# Patient Record
Sex: FEMALE | Employment: FULL TIME | URBAN - METROPOLITAN AREA
[De-identification: names, ages, dates, MRNs, and addresses within clinical notes are randomized per-mention and may not be internally consistent; named-entity substitution may affect disease eponyms.]

---

## 2017-03-02 DIAGNOSIS — H47.10 PAPILLEDEMA: ICD-10-CM

## 2017-03-02 DIAGNOSIS — G93.2 IDIOPATHIC INTRACRANIAL HYPERTENSION: ICD-10-CM

## 2017-03-03 ENCOUNTER — OFFICE VISIT (OUTPATIENT)
Dept: OPHTHALMOLOGY | Facility: CLINIC | Age: 22
End: 2017-03-03
Attending: OPHTHALMOLOGY
Payer: COMMERCIAL

## 2017-03-03 DIAGNOSIS — H47.10 PAPILLEDEMA: ICD-10-CM

## 2017-03-03 DIAGNOSIS — H50.34 INTERMITTENT EXOTROPIA, ALTERNATING: Primary | ICD-10-CM

## 2017-03-03 DIAGNOSIS — G93.2 IDIOPATHIC INTRACRANIAL HYPERTENSION: ICD-10-CM

## 2017-03-03 PROCEDURE — 92083 EXTENDED VISUAL FIELD XM: CPT | Mod: ZF | Performed by: OPHTHALMOLOGY

## 2017-03-03 PROCEDURE — 92060 SENSORIMOTOR EXAMINATION: CPT | Mod: ZF | Performed by: OPHTHALMOLOGY

## 2017-03-03 PROCEDURE — 99214 OFFICE O/P EST MOD 30 MIN: CPT | Mod: ZF

## 2017-03-03 ASSESSMENT — SLIT LAMP EXAM - LIDS
COMMENTS: NORMAL
COMMENTS: NORMAL

## 2017-03-03 ASSESSMENT — VISUAL ACUITY
METHOD: SNELLEN - LINEAR
CORRECTION_TYPE: CONTACTS
OS_CC+: -2
OS_CC: 20/20
OD_CC: 20/20

## 2017-03-03 ASSESSMENT — EXTERNAL EXAM - LEFT EYE: OS_EXAM: NORMAL

## 2017-03-03 ASSESSMENT — REFRACTION_WEARINGRX
OD_AXIS: 092
OS_AXIS: 091
OS_SPHERE: -5.00
OD_CYLINDER: +0.50
OS_CYLINDER: +1.00
OD_SPHERE: -5.00

## 2017-03-03 ASSESSMENT — TONOMETRY
OD_IOP_MMHG: 16
OS_IOP_MMHG: 16
IOP_METHOD: TONOPEN

## 2017-03-03 ASSESSMENT — CONF VISUAL FIELD
OD_NORMAL: 1
OS_NORMAL: 1

## 2017-03-03 ASSESSMENT — CUP TO DISC RATIO
OS_RATIO: 0.1
OD_RATIO: 0.1

## 2017-03-03 ASSESSMENT — EXTERNAL EXAM - RIGHT EYE: OD_EXAM: NORMAL

## 2017-03-03 NOTE — MR AVS SNAPSHOT
After Visit Summary   3/3/2017    Sudha Arevalo    MRN: [id number]           Patient Information     Date Of Birth          1995        Visit Information        Provider Department      3/3/2017 2:30 PM [facility] Jameson MD [facility]        Today's Diagnoses     Intermittent exotropia, alternating    -  1    Idiopathic intracranial hypertension        Papilledema           Follow-ups after your visit        Your next 10 appointments already scheduled     2017  3:15 PM CDT   RETURN NEURO with Jerald Jameson MD   Eye Clinic ([facility])    [address]  [address]  [facility] [id number] [phone]              Who to contact     Please call your clinic at [phone] to:    Ask questions about your health    Make or cancel appointments    Discuss your medicines    Learn about your test results    Speak to your doctor   If you have compliments or concerns about an experience at your clinic, or if you wish to file a complaint, please contact [facility] Patient Relations at [phone] or email us at [email or web address]         Additional Information About Your Visit        MyChart Information     Rivalryt is an electronic gateway that provides easy, online access to your medical records. With [employer], you can request a clinic appointment, read your test results, renew a prescription or communicate with your care team.     To sign up for Rivalryt visit the website at [email or web address]   You will be asked to enter the access code listed below, as well as some personal information. Please follow the directions to create your username and password.     Your access code is: [email or web address]  Expires: 2017  8:30 AM     Your access code will  in 90 days. If you need help or a new code, please contact your [facility] or call [phone]  for assistance.        Care EveryWhere ID     This is your Care EveryWhere ID. This could be used by other organizations to access your [place] medical records  [id number]         Blood Pressure from Last 3 Encounters:   No data found for BP    Weight from Last 3 Encounters:   No data found for Wt              We Performed the Following     DILATED FUNDUS EXAM     Glaucoma Top OU     IOP Measurement     Sensorimotor        Primary Care Provider Office Phone # Fax #    [facility] [phone]       [address], [address] [address]        Thank you!     Thank you for choosing EYE CLINIC  for your care. Our goal is always to provide you with excellent care. Hearing back from our patients is one way we can continue to improve our services. Please take a few minutes to complete the written survey that you may receive in the mail after your visit with us. Thank you!             Your Updated Medication List - Protect others around you: Learn how to safely use, store and throw away your medicines at www.disposemymeds.org.          This list is accurate as of: 3/3/17  3:32 PM.  Always use your most recent med list.                   Brand Name Dispense Instructions for use    acetaZOLAMIDE 500 MG 12 hr capsule    DIAMOX SEQUEL    60 capsule    Take 1 capsule (500 mg) by mouth 2 times daily

## 2017-03-03 NOTE — PROGRESS NOTES
1. Probable pseudotumor cerebri (patient has declined lumbar puncture but otherwise exam, MRI, and history consistent with diagnosis):      - For a more thorough description of the disease presentation, please see my letter from the patient's initial visit with me on 9/23/16  - - MRI / V negative for structural etiology for papilledema   - patient deferred lumbar puncture and accepts the very small chance of an alternative but potentially very serious etiology      - Exam today shows resolved papilledema in the right eye and possible trace residual papilledema in the left eye   - octopus automated 30 degree visual fields essentially full in both eyes at initial visit- still full in the right eye and in the left eye has mild biarcuate defects that I suspect are testing artifact from poor performance on test.  - headaches resolved.  Patient reports she has lost weight but not sure how much.    - Continue DIamox 500 mg twice a day   - Counseled patient on importance of weight loss. Continue to lose weight.  - return to clinic in 4 months     2. Intermittent exotropia   - Decent control. Patient wishes to observe without surgery.    4 month follow-up      Complete documentation of historical and exam elements from today's encounter can be found in the full encounter summary report (not reduplicated in this progress note).  I personally obtained the chief complaint(s) and history of present illness.  I confirmed and edited as necessary the review of systems, past medical/surgical history, family history, social history, and examination findings as documented by others; and I examined the patient myself.  I personally reviewed the relevant tests, images, and reports as documented above.  I formulated and edited as necessary the assessment and plan and discussed the findings and management plan with the patient and family   Jerald Jameson MD  10:10 PM  3/3/2017

## 2017-08-01 DIAGNOSIS — H47.10 PAPILLEDEMA: Primary | ICD-10-CM

## 2017-08-02 ENCOUNTER — OFFICE VISIT (OUTPATIENT)
Dept: OPHTHALMOLOGY | Facility: CLINIC | Age: 22
End: 2017-08-02
Attending: OPHTHALMOLOGY
Payer: COMMERCIAL

## 2017-08-02 DIAGNOSIS — H47.10 PAPILLEDEMA: ICD-10-CM

## 2017-08-02 PROCEDURE — 99214 OFFICE O/P EST MOD 30 MIN: CPT | Mod: ZF

## 2017-08-02 PROCEDURE — 92083 EXTENDED VISUAL FIELD XM: CPT | Mod: ZF | Performed by: OPHTHALMOLOGY

## 2017-08-02 ASSESSMENT — VISUAL ACUITY
OS_CC: 20/20
METHOD: SNELLEN - LINEAR
CORRECTION_TYPE: CONTACTS
OD_CC: 20/20

## 2017-08-02 ASSESSMENT — EXTERNAL EXAM - LEFT EYE: OS_EXAM: NORMAL

## 2017-08-02 ASSESSMENT — CUP TO DISC RATIO
OS_RATIO: 0.1
OD_RATIO: 0.1

## 2017-08-02 ASSESSMENT — CONF VISUAL FIELD
OS_NORMAL: 1
OD_NORMAL: 1

## 2017-08-02 ASSESSMENT — REFRACTION_WEARINGRX
OS_AXIS: 091
OD_CYLINDER: +0.50
OD_AXIS: 092
OD_SPHERE: -5.00
OS_SPHERE: -5.00
OS_CYLINDER: +1.00

## 2017-08-02 ASSESSMENT — TONOMETRY
IOP_METHOD: TONOPEN
OS_IOP_MMHG: 16
OD_IOP_MMHG: 15

## 2017-08-02 ASSESSMENT — SLIT LAMP EXAM - LIDS
COMMENTS: NORMAL
COMMENTS: NORMAL

## 2017-08-02 ASSESSMENT — EXTERNAL EXAM - RIGHT EYE: OD_EXAM: NORMAL

## 2017-08-02 NOTE — NURSING NOTE
Chief Complaints and History of Present Illnesses   Patient presents with     Neurologic Problem     IIH     HPI    Symptoms:              Comments:  Sudha is a 21 year old female presenting with a history of:    1. IIH   - still on 500 mg diamox bid. Not as consistent with medication. Sometimes misses days. Still no headaches when she misses doses.     2. X(T)  No changes.     Sanjay Palacios CO 3:20 PM August 2, 2017                Compliance is 65-70%.  No pulsatile tinnitus.  No headaches.

## 2017-08-02 NOTE — LETTER
August 3, 2017    RE: Sudha Arevalo  : 1995  MRN: [id number]    Dear Providers,    I saw our mutual patient, Sudha Arevalo, in follow-up in my clinic recently.  After a thorough neuro-ophthalmic history and examination, I came to the following conclusions:    1. Probable pseudotumor cerebri (patient has declined lumbar puncture but otherwise exam, MRI, and history consistent with diagnosis):       - For a more thorough description of the disease presentation, please see my letter from the patient's initial visit with me on 16  -  MRI / V negative for structural etiology for papilledema   - patient deferred lumbar puncture and accepts the very small chance of an alternative but potentially very serious etiology  - last seen March 3, 2017  - patient denies symptoms of increased intracranial pressure-no headaches.  - patient unsure of current status of her weight  - continues on Diamox 500 mg twice a day but admits she is only intermittently compliant (misses about 40% of the time)  - optic disc edema resolved today in both eyes.    - octopus automated 30 degree visual fields full in both eyes today  - Follow-up 6 months for recheck.  - Discussed the need to monitor weight and avoid weight gain (as the primary cause of pseudotumor cerebri recurrence)  - Stop DIamox    For further exam details, please feel free to contact our office for additional records.  If you wish to contact me regarding this patient please email me at [email or web address] or give my clinic a call to arrange a phone conversation.    Sincerely,    Jerald Jameson MD  , Neuro-Ophthalmology and Adult Strabismus  Department of Ophthalmology and Visual Neurosciences  [facility]

## 2017-08-02 NOTE — MR AVS SNAPSHOT
After Visit Summary   2017    Sudha Arevalo    MRN: [id number]           Patient Information     Date Of Birth          1995        Visit Information        Provider Department      2017 3:00 PM J[facility] Jameson MD [facility]        Today's Diagnoses     Papilledema           Follow-ups after your visit        Who to contact     Please call your clinic at [phone] to:    Ask questions about your health    Make or cancel appointments    Discuss your medicines    Learn about your test results    Speak to your doctor   If you have compliments or concerns about an experience at your clinic, or if you wish to file a complaint, please contact [facility] Patient Relations at [phone] or email us at [email or web address]         Additional Information About Your Visit        MyChart Information     B[employer] is an electronic gateway that provides easy, online access to your medical records. With [employer], you can request a clinic appointment, read your test results, renew a prescription or communicate with your care team.     To sign up for [email or web address].comt visit the website at [email or web address]   You will be asked to enter the access code listed below, as well as some personal information. Please follow the directions to create your username and password.     Your access code is: [email or web address]  Expires: 2017  6:30 AM     Your access code will  in 90 days. If you need help or a new code, please contact your [facility] or call [phone] for assistance.        Care EveryWhere ID     This is your Care EveryWhere ID. This could be used by other organizations to access your [place] medical records  [id number]         Blood Pressure from Last 3 Encounters:   No data found for BP    Weight from Last 3 Encounters:   No data found for Wt              We Performed the Following     DILATED FUNDUS  EXAM     Glaucoma Top OU     IOP Measurement        Primary Care Provider Office Phone # Fax #    [facility] [phone]       [facility] [address], [address]  [facility] [address]        Equal Access to Services     JORDAN ANDRADE : Hadii bakari monzon hadxaviero Soomaali, waaxda luqadaha, qaybta kaalmada adeegyada, waxsheeba idiin haynoemyn adesharonda oliver deandra solorzano. So [facility] [phone].    ATENCIÓN: Si habla español, tiene a gill disposición servicios gratuitos de asistencia lingüística. Llame al [phone].    We comply with applicable federal civil rights laws and [place] laws. We do not discriminate on the basis of race, color, national origin, age, disability sex, sexual orientation or gender identity.            Thank you!     Thank you for choosing EYE CLINIC  for your care. Our goal is always to provide you with excellent care. Hearing back from our patients is one way we can continue to improve our services. Please take a few minutes to complete the written survey that you may receive in the mail after your visit with us. Thank you!             Your Updated Medication List - Protect others around you: Learn how to safely use, store and throw away your medicines at www.disposemymeds.org.          This list is accurate as of: 8/2/17  4:50 PM.  Always use your most recent med list.                   Brand Name Dispense Instructions for use Diagnosis    acetaZOLAMIDE 500 MG 12 hr capsule    DIAMOX SEQUEL    60 capsule    Take 1 capsule (500 mg) by mouth 2 times daily    IIH (idiopathic intracranial hypertension)

## 2017-08-02 NOTE — PROGRESS NOTES
1. Probable pseudotumor cerebri (patient has declined lumbar puncture but otherwise exam, MRI, and history consistent with diagnosis):       - For a more thorough description of the disease presentation, please see my letter from the patient's initial visit with me on 9/23/16  -  MRI / V negative for structural etiology for papilledema   - patient deferred lumbar puncture and accepts the very small chance of an alternative but potentially very serious etiology    - last seen March 3, 2017  - patient denies symptoms of increased intracranial pressure-no headaches.  - patient unsure of current status of her weight  - continues on Diamox 500 mg twice a day but admits she is only intermittently compliant (misses about 40% of the time)    - optic disc edema resolved today in both eyes.    - octopus automated 30 degree visual fields full in both eyes today    - Follow-up 6 months for recheck.  - Discussed the need to monitor weight and avoid weight gain (as the primary cause of pseudotumor cerebri recurrence)  - Stop DIamox     Complete documentation of historical and exam elements from today's encounter can be found in the full encounter summary report (not reduplicated in this progress note).  I personally obtained the chief complaint(s) and history of present illness.  I confirmed and edited as necessary the review of systems, past medical/surgical history, family history, social history, and examination findings as documented by others; and I examined the patient myself.  I personally reviewed the relevant tests, images, and reports as documented above.  I formulated and edited as necessary the assessment and plan and discussed the findings and management plan with the patient and family     Jerald Jameson MD

## 2017-08-03 ENCOUNTER — RECORDS - HEALTHEAST (OUTPATIENT)
Dept: ADMINISTRATIVE | Facility: OTHER | Age: 22
End: 2017-08-03

## 2018-04-30 ENCOUNTER — COMMUNICATION - HEALTHEAST (OUTPATIENT)
Dept: SCHEDULING | Facility: CLINIC | Age: 23
End: 2018-04-30

## 2018-06-23 ENCOUNTER — COMMUNICATION - HEALTHEAST (OUTPATIENT)
Dept: SCHEDULING | Facility: CLINIC | Age: 23
End: 2018-06-23

## 2019-02-14 ENCOUNTER — OFFICE VISIT - HEALTHEAST (OUTPATIENT)
Dept: FAMILY MEDICINE | Facility: CLINIC | Age: 24
End: 2019-02-14

## 2019-02-14 DIAGNOSIS — M25.561 ACUTE PAIN OF RIGHT KNEE: ICD-10-CM

## 2019-04-17 ENCOUNTER — COMMUNICATION - HEALTHEAST (OUTPATIENT)
Dept: SCHEDULING | Facility: CLINIC | Age: 24
End: 2019-04-17

## 2019-04-18 ENCOUNTER — OFFICE VISIT - HEALTHEAST (OUTPATIENT)
Dept: FAMILY MEDICINE | Facility: CLINIC | Age: 24
End: 2019-04-18

## 2019-04-18 DIAGNOSIS — S89.91XD INJURY OF RIGHT KNEE, SUBSEQUENT ENCOUNTER: ICD-10-CM

## 2019-07-03 ENCOUNTER — COMMUNICATION - HEALTHEAST (OUTPATIENT)
Dept: INTERNAL MEDICINE | Facility: CLINIC | Age: 24
End: 2019-07-03

## 2019-07-03 DIAGNOSIS — Z23 NEED FOR HPV VACCINATION: ICD-10-CM

## 2019-07-08 ENCOUNTER — COMMUNICATION - HEALTHEAST (OUTPATIENT)
Dept: TELEHEALTH | Facility: CLINIC | Age: 24
End: 2019-07-08

## 2019-07-08 ENCOUNTER — AMBULATORY - HEALTHEAST (OUTPATIENT)
Dept: NURSING | Facility: CLINIC | Age: 24
End: 2019-07-08

## 2019-07-08 DIAGNOSIS — Z23 NEED FOR HPV VACCINATION: ICD-10-CM

## 2019-07-16 ENCOUNTER — OFFICE VISIT - HEALTHEAST (OUTPATIENT)
Dept: FAMILY MEDICINE | Facility: CLINIC | Age: 24
End: 2019-07-16

## 2019-07-16 DIAGNOSIS — L70.9 ACNE, UNSPECIFIED ACNE TYPE: ICD-10-CM

## 2019-08-14 ENCOUNTER — RECORDS - HEALTHEAST (OUTPATIENT)
Dept: ADMINISTRATIVE | Facility: OTHER | Age: 24
End: 2019-08-14

## 2019-09-19 ENCOUNTER — COMMUNICATION - HEALTHEAST (OUTPATIENT)
Dept: FAMILY MEDICINE | Facility: CLINIC | Age: 24
End: 2019-09-19

## 2020-02-24 ENCOUNTER — AMBULATORY - HEALTHEAST (OUTPATIENT)
Dept: NURSING | Facility: CLINIC | Age: 25
End: 2020-02-24

## 2020-02-24 DIAGNOSIS — Z23 NEED FOR INFLUENZA VACCINATION: ICD-10-CM

## 2020-10-01 ENCOUNTER — COMMUNICATION - HEALTHEAST (OUTPATIENT)
Dept: FAMILY MEDICINE | Facility: CLINIC | Age: 25
End: 2020-10-01

## 2020-10-01 ENCOUNTER — NURSE TRIAGE (OUTPATIENT)
Dept: NURSING | Facility: CLINIC | Age: 25
End: 2020-10-01

## 2020-10-01 NOTE — TELEPHONE ENCOUNTER
Patient reporting she was tested at 9/28/20 with symptoms of  sore throat and nasal discharge. Patient received positive COVID 19 lab results from [facility] today. Patient is requesting to know what she is to do with isolation guidelines? Stating symptoms have improved. Afebrile.       How can I protect others?    If you have symptoms: stay home and away from others (self-isolate) until:    You've had no fever--and no medicine that reduces fever--for 1 full day (24 hours). And       Your other symptoms have gotten better. For example, your cough or breathing has improved. And     At least 10 days have passed since your symptoms started. (If you've been told by a doctor that you have a weak immune system, wait 20 days.)       During this time:    Stay in your own room, including for meals. Use your own bathroom if you can.    Stay away from others in your home. No hugging, kissing or shaking hands. No visitors.     Don't go to work, school or anywhere else.     Clean  high touch  surfaces often (doorknobs, counters, handles, etc.). Use a household cleaning spray or wipes. You'll find a full list on the EPA website at www.epa.gov/pesticide-registration/list-n-disinfectants-use-against-sars-cov-2.     Cover your mouth and nose with a mask, tissue or other face covering to avoid spreading germs.    Wash your hands and face often with soap and water.    Caregivers in these groups are at risk for severe illness due to COVID-19:  o People 65 years and older  o People who live in a nursing home or long-term care facility  o People with chronic disease (lung, heart, cancer, diabetes, kidney, liver, immunologic)  o People who have a weakened immune system, including those who:  - Are in cancer treatment  - Take medicine that weakens the immune system, such as corticosteroids  - Had a bone marrow or organ transplant  - Have an immune deficiency  - Have poorly controlled HIV or AIDS  - Are obese (body mass index of 40 or  higher)  - Smoke regularly    Caregivers should wear gloves while washing dishes, handling laundry and cleaning bedrooms and bathrooms.    Wash and dry laundry with special caution. Don't shake dirty laundry, and use the warmest water setting you can.    If you have a weakened immune system, ask your doctor about other actions you should take.    For more tips, go to www.cdc.gov/coronavirus/2019-ncov/downloads/10Things.pdf.    You should not go back to work until you meet the guidelines above for ending your home isolation. You don't need to be retested for COVID-19 before going back to work--studies show that you won't spread the virus if it's been at least 10 days since your symptoms started (or 20 days, if you have a weak immune system).    Employers: This document serves as formal notice of your employee's medical guidelines for going back to work. They must meet the above guidelines before going back to work in person.    How can I take care of myself?    1. Get lots of rest. Drink extra fluids (unless a doctor has told you not to).    2. Take Tylenol (acetaminophen) for fever or pain. If you have liver or kidney problems, ask your family doctor if it's okay to take Tylenol.     Take either:     650 mg (two 325 mg pills) every 4 to 6 hours, or     1,000 mg (two 500 mg pills) every 8 hours as needed.     Note: Don't take more than 3,000 mg in one day. Acetaminophen is found in many medicines (both prescribed and over-the-counter medicines). Read all labels to be sure you don't take too much.    For children, check the Tylenol bottle for the right dose (based on their age or weight).    3. If you have other health problems (like cancer, heart failure, an organ transplant or severe kidney disease): Call your specialty clinic if you don't feel better in the next 2 days.    4. Know when to call 911: Emergency warning signs include:    Trouble breathing or shortness of breath    Pain or pressure in the chest that  doesn't go away    Feeling confused like you haven't felt before, or not being able to wake up    Bluish-colored lips or face          What to Do If You're Sick: www.cdc.gov/coronavirus/2019-ncov/about/steps-when-sick.html    Ending Home Isolation: www.cdc.gov/coronavirus/2019-ncov/hcp/disposition-in-home-patients.html     Caring for Someone with COVID-19: www.cdc.gov/coronavirus/2019-ncov/if-you-are-sick/care-for-someone.html     [facility] clinical trials (COVID-19 research studies): clinicalaffairs.[facility]-clinical-trials               Additional Information    Negative: [1] Caller is not with the adult (patient) AND [2] reporting urgent symptoms    Negative: Lab result questions    Negative: Medication questions    Negative: Caller can't be reached by phone    Negative: Caller has already spoken to PCP or another triager    Negative: RN needs further essential information from caller in order to complete triage    Negative: Requesting regular office appointment    Negative: [1] Caller requesting NON-URGENT health information AND [2] PCP's office is the best resource    Health Information question, no triage required and triager able to answer question    Protocols used: INFORMATION ONLY CALL-A-

## 2021-04-27 ENCOUNTER — COMMUNICATION - HEALTHEAST (OUTPATIENT)
Dept: SCHEDULING | Facility: CLINIC | Age: 26
End: 2021-04-27

## 2021-05-27 NOTE — PROGRESS NOTES
Office Visit - Follow Up   Sudha Arevalo   23 y.o. female    Date of Visit: 4/18/2019    Chief Complaint   Patient presents with     Follow-up     R Knee- Swelling Has Gone Down, Less Pain, Still Feels Firm To The Touch        Assessment and Plan   1. Injury of right knee, subsequent encounter  Symptoms have improved. Mild residual inflammation. Recommend using warm compresses and light weight knee exercise.   Follow up if symptom worsens.     The following high BMI interventions were performed this visit: encouragement to exercise and dietary management education, guidance, and counseling    No follow-ups on file.     History of Present Illness   Sudha Arevalo, 23 y.o. female presents for follow up of her right knee injury. She reports that fell two months ago while she was rollerskating. She was seen at the clinic and was evaluated for structural damage to her knee and ligaments. Evaluation was unremarkable but she was found to have hematoma. Since then hematoma has gotten better but not she can still feel very small swelling and tightness. She was wondering if this was normal. She has continued to remain active. She has no other concerns.     Review of Systems: A comprehensive review of systems was negative except as noted.     Medications, Allergies and Problem List   Reviewed and updated     Physical Exam   General Appearance: Well groomed    /81   Pulse 93   Wt (!) 279 lb 9.6 oz (126.8 kg)   BMI 40.12 kg/m    General appearance: alert, appears stated age and cooperative  Head: Normocephalic, without obvious abnormality, atraumatic  Extremities: Very small inflammation lateral to the patella of the right knee. No tenderness with palpation or movement.   Skin: Skin color, texture, turgor normal. No rashes or lesions  Neurologic: Grossly normal     Additional Information   No current outpatient medications on file.     No current facility-administered medications for this visit.      No  Known Allergies  Social History     Tobacco Use     Smoking status: Never Smoker     Smokeless tobacco: Never Used   Substance Use Topics     Alcohol use: Not on file     Drug use: Not on file        Gayathri Chun CNP

## 2021-05-27 NOTE — TELEPHONE ENCOUNTER
"Pt calling.  She had a right knee injury during a roller blade accident on 2/14/19.  Was seen at [facility] on 2/14/19  Patient presents with acute onset left knee pain.  Differentials include all of the following: knee ligament tear versus tendon tear, versus fracture.  Offered knee x-ray and patient declines at this time treatment plan included rest, ice, compression, elevation. Patient educated regarding when to follow-up if pain, edema, or ecchymosis increases. Patient is understanding of the plan.     Pt states her knee doesn't hurt anymore but has a \"hard lump on my knee that I would like to have assessed.    Will transfer Pt to scheduling to schedule an appt to be seen in clinic.  Natalie Dee, RN, [facility] RN Triage/Med Refills       .      "

## 2021-05-30 NOTE — TELEPHONE ENCOUNTER
Patient is on the CSS for 3rd HPV vaccine on 7/8/2019 last dose was,     HPV 9 Valent 4/18/2019, 1/12/2016           HPV Quadrivalent 8/12/2011        Will pend orders, please sign if appropriate.     Vanna Cardozo CMA  12:35 PM  7/3/2019

## 2021-05-30 NOTE — PROGRESS NOTES
Assessment/Plan:     1. Acne, unspecified acne type  Suspect hormonal acne.  Referral placed for dermatology to discuss treatments.  Encouraged patient to schedule a physical to complete her pap screening.   - Ambulatory referral to Dermatology        Subjective:     Sudha Arevalo is a 23 y.o. female who presents with a request for dermatology referral.  Patient has been dealing with acne for many years.  Mainly complains of pimples on her chin, neck, chest, and back.  The acne on her back has gotten better.  She has some scarring and discoloration of the skin from acne that she is unhappy with as well.  Patient has never seen a dermatologist.  She is currently washing her face twice daily with Neutrogena, using a toner, as well as a moisturizer.  She wears make-up on a daily basis.  She has noticed she tends to have more breakouts during her menstrual period.  She does have some unwanted hair to the chin and chest as well.  Her menstrual cycles are regular.  She has been working on diet and exercise, and has gradually lost about 40 pounds.      The following portions of the patient's history were reviewed and updated as appropriate: allergies, current medications.      Objective:     /60   Pulse 80   Wt (!) 256 lb 11.2 oz (116.4 kg)   LMP 06/28/2019   BMI 36.83 kg/m      General Appearance: Alert, cooperative, no distress, appears stated age  Skin: papular lesions to chin, chest, and neck. There is some coarse hair along the chin and chest.       Liset Montanez NP

## 2021-06-02 VITALS — WEIGHT: 293 LBS | BODY MASS INDEX: 42.47 KG/M2

## 2021-06-02 VITALS — WEIGHT: 279.6 LBS | BODY MASS INDEX: 40.12 KG/M2

## 2021-06-03 VITALS — BODY MASS INDEX: 36.83 KG/M2 | WEIGHT: 256.7 LBS

## 2021-06-11 NOTE — TELEPHONE ENCOUNTER
Who is calling:  Patient   Reason for Call:  Caller stated that she took the test for covid at [facility] and got the test back this morning and it came out positive. Caller just wanted to let Gayathri Chun FNP know.   Date of last appointment with primary care:   Okay to leave a detailed message: Yes

## 2021-06-17 NOTE — PATIENT INSTRUCTIONS - HE
Patient Instructions by Vero Garcia CNS at 2/14/2019  6:40 PM     Author: Vero Garcia CNS Service: -- Author Type: Clinical Nurse Specialist    Filed: 2/14/2019  7:15 PM Encounter Date: 2/14/2019 Status: Addendum    : Vero Garcia CNS (Clinical Nurse Specialist)    Related Notes: Original Note by Vero Garcia CNS (Clinical Nurse Specialist) filed at 2/14/2019  7:15 PM         Patient Education     Knee Sprain    A sprain is an injury to the ligaments or capsule that holds a joint together. There are no broken bones. Most sprains take 3 to 6 weeks to heal. If it a severe sprain where the ligament is completely torn, it can take months to recover.  Most knee sprains are treated with a splint, knee immobilizer brace, or elastic wrap for support. Severe sprains may require surgery.  Home care    Stay off the injured leg as much as possible until you can walk on it without pain. If you have a lot of pain with walking, crutches or a walker may be prescribed. (These can be rented or purchased at many pharmacies and surgical or orthopedic supply stores). Follow your healthcare provider's advice about when to begin putting weight on that leg.    Keep your leg elevated to reduce pain and swelling. When sleeping, place a pillow under the injured leg. When sitting, support the injured leg so it is level with your waist. This is very important during the first 48 hours.    Apply an ice pack over the injured area for 15 to 20 minutes every 3 to 6 hours. You should do this for the first 24 to 48 hours. You can make an ice pack by filling a plastic bag that seals at the top with ice cubes and then wrapping it with a thin towel. Continue to use ice packs for relief of pain and swelling as needed. As the ice melts, be careful to avoid getting your wrap, splint, or cast wet. After 48 hours, apply heat (warm shower or warm bath) for 15 to 20 minutes several times a day, or alternate ice and  heat. You can place the ice pack directly over the splint. If you have to wear a hook-and-loop knee brace, you can open it to apply the ice pack, or heat, directly to the knee. Never put ice directly on the skin. Always wrap the ice in a towel or other type of cloth.    You may use over-the-counter pain medicine to control pain, unless another pain medicine was prescribed.If you have chronic liver or kidney disease or ever had a stomach ulcer or GI bleeding, talk with your healthcare provider before using these medicines.    If you were given a splint, keep it completely dry at all times. Bathe with your splint out of the water, protected with 2 large plastic bags, rubber-banded at the top end. If a fiberglass splint gets wet, you can dry it with a hair dryer. If you have a hook-and-loop knee brace, you can remove this to bathe, unless told otherwise.  Follow-up care  Follow up with your doctor as advised. Any X-rays you had today dont show any broken bones, breaks, or fractures. Sometimes fractures dont show up on the first X-ray. Bruises and sprains can sometimes hurt as much as a fracture. These injuries can take time to heal completely. If your symptoms dont improve or they get worse, talk with your doctor. You may need a repeat X-ray. If X-rays were taken, you will be told of any new findings that may affect your care.  Call 911  Call 911 if you have:     Shortness of breath     Chest pain  When to seek medical advice  Call your healthcare provider right away if any of these occur:    The splint or knee immobilizer brace becomes wet or soft    The fiberglass cast or splint remains wet for more than 24 hours    Pain or swelling increases    The injured leg or toes become cold, blue, numb, or tingly  Date Last Reviewed: 11/20/2015 2000-2017 [employer]. [address] [place] [address], [place] [address]. All rights reserved. This information is not intended as a substitute for professional medical care.  Always follow your healthcare professional's instructions.           Patient Education     Knee Pain  Knee pain is very common. Its especially common in active people who put a lot of pressure on their knees, like runners. It affects women more often than men.  Your kneecap (patella) is a thick, round bone. It covers and protects the front portion of your knee joint. It moves along a groove in your thighbone (femur) as part of the patellofemoral joint. A layer of cartilage surrounds the underside of your kneecap. This layer protects it from grinding against your femur.  When this cartilage softens and breaks down, it can cause knee pain. This is partly because of repetitive stress. The stress irritates the lining of the joint. This causes pain in the underlying bone.  What causes knee pain?  Many things can cause knee pain. You may have more than one cause. Some of these include:    Overuse of the knee joint    The kneecap doesnt line up with the tissue around it    Damage to small nerves in the area    Damage to the ligament-like structure that holds the kneecap in place (retinaculum)    Breakdown of the bone under the cartilage    Swelling in the soft tissues around the kneecap    Injury  You might be more likely to have knee pain if you:    Exercise a lot    Recently increased the intensity of your workouts    Have a body mass index (BMI) greater than 25    Have poor alignment of your kneecap    Walk with your feet turned overly outward or inward    Have weakness in surrounding muscle groups (inner quad or hip adductor muscles)    Have too much tightness in surrounding muscle groups (hamstrings or iliotibial band)    Have a recent history of injury to the area    Are female  Symptoms of knee pain  This type of knee pain is a dull, aching pain in the front of the knee in the area under and around the kneecap. This pain may start quickly or slowly. Your pain might be worse when you squat, run, or sit for a long  time. You might also sometimes feel like your knee is giving out. You may have symptoms in one or both of your knees.  Diagnosing knee pain  Your healthcare provider will ask about your medical history and your symptoms. Be sure to describe any activities that make your knee pain worse. He or she will look at your knee. This will include tests of your range of motion, strength, and areas of pain of your knee. Your knee alignment will be checked.  Your healthcare provider will need to rule out other causes of your knee pain, such as arthritis. You may need an imaging test, such as an X-ray or MRI.  Treatment for knee pain  Treatments that can help ease your symptoms may include:    Avoiding activities for a while that make your pain worse, returning to activity over time    Icing the outside of your knee when it causes you pain    Taking over-the-counter pain medicine    Wearing a knee brace or taping your knee to support it    Wearing special shoe inserts to help keep your feet in the proper alignment    Doing special exercises to stretch and strengthen the muscles around your hip and your knee  These steps help most people manage knee pain. But some cases of knee pain need to be treated with surgery. You may need surgery right away. Or you may need it later if other treatments dont work. Your healthcare provider may refer you to an orthopedic surgeon. He or she will talk with you about your choices.  Preventing knee pain  Losing weight and correcting excess muscle tightness or muscle weakness may help lower your risk.  In some cases, you can prevent knee pain. To help prevent a flare-up of knee pain, you do these things:    Regularly do all the exercises your doctor or physical therapist advises    Support your knee as advised by your doctor or physical therapist    Increase training gradually, and ease up on training when needed    Have an expert check your gait for running or other sporting activities    Stretch  properly before and after exercise    Replace your running shoes regularly    Lose excess weight     When to call your healthcare provider  Call your healthcare provider right away if:    Your symptoms dont get better after a few weeks of treatment    You have any new symptoms   Date Last Reviewed: 4/1/2017 2000-2017 [employer]. [address] [place] [address]. All rights reserved. This information is not intended as a substitute for professional medical care. Always follow your healthcare professional's instructions.           Patient Education     Reducing Knee Pain and Swelling    Many treatments can help reduce pain and swelling in your knee. Your healthcare provider or physical therapist may suggest one or more of the following treatments:    Icing your knee helps reduce swelling. You may be asked to ice your knee once a day or more. Apply ice for about 15 to 20 minutes at a time, with at least 40 minutes between sessions. Always keep a towel between the ice and your skin.     Keeping your leg raised above your heart helps excess fluid flow out of your knee joint. This reduces swelling.    Compression means wrapping an elastic bandage or neoprene sleeve snugly around your knees. This keeps fluid from collecting in your knee joint.    Electrical stimulation, done by a physical therapist or , can help reduce excess fluid in your knee joint.    Anti-inflammatory medicines may be prescribed by your healthcare provider. You may take pills or receive injections in your knee.    Isometric (lashae) exercises strengthen the muscles that support your knee joint. They also help reduce excess fluid in your knee.    Massage helps fluid drain away from your knee.  Date Last Reviewed: 10/13/2015    3543-4867 [employer]. [address] [place] [address]. All rights reserved. This information is not intended as a substitute for professional medical care.  Always follow your healthcare professional's instructions.

## 2021-06-17 NOTE — TELEPHONE ENCOUNTER
Based review of the scheduling protocol, the patient would meet criteria for a virtual visit, however they are out of state or will be at the time of the visit.  Does this patient meet criteria, in your clinical judgement, to proceed with a virtual visit?   Please work directly with the patient to arrange the appropriate virtual vs in person visit.     Patient states she has been having headaches, started last Monday.    Patient has not been able to seek care in [place], asking if provider can do a virtual visit with her as soon as able.

## 2021-06-17 NOTE — TELEPHONE ENCOUNTER
I don't believe you are able to complete this visit since patient is not in [place] or [place] due to your credentialings. I'm not sure if patient is able to just submit an evisit on [employer]?

## 2021-06-19 NOTE — LETTER
Letter by Gayathri Chun FNP at      Author: Gayathri Chun FNP Service: -- Author Type: --    Filed:  Encounter Date: 9/19/2019 Status: (Other)         Sudha Arevalo  [address] [place] [address]             September 19, 2019         Dear Ms. Arevalo,    Our records indicate that you are due for a cervical cancer screen/pap screen.  Please call the clinic or use my chart and schedule an office visit to complete this.  If you have had this done somewhere other than [facility], please help us obtain a copy of your testing/results so we can update your records.  You can also just let us know when and where you had the testing completed through a phone call or through my chart.The records can be faxed to us at [phone].    Please call with questions or contact us using [employer].    Sincerely,        Electronically signed by SANDI Gao

## 2021-06-27 NOTE — PROGRESS NOTES
Progress Notes by Vero Garcia CNS at 2/14/2019  6:40 PM     Author: Vero Garcia CNS Service: -- Author Type: Clinical Nurse Specialist    Filed: 2/14/2019  7:36 PM Encounter Date: 2/14/2019 Status: Attested    : Vero Garcia CNS (Clinical Nurse Specialist) Cosigner: Virginia Oneill CNP at 2/14/2019  7:36 PM    Attestation signed by Virginia Oneill CNP at 2/14/2019  7:36 PM    I personally interviewed and examined the patient with a nurse practitioner student.  I reviewed the nurse practitioner student note, discussed the plan of care and I agree with the plan of care.    I discussed red flag symptoms, return precautions to clinic/ER and follow up care with patient/parent.  Expected clinical course, symptomatic cares advised. Questions answered. Patient/parent amenable with plan.                     Assessment:     1. Acute pain of right knee            Plan:     Patient presents with acute onset left knee pain.  Differentials include all of the following: knee ligament tear versus tendon tear, versus fracture.  Offered knee x-ray and patient declines at this time treatment plan included rest, ice, compression, elevation. Patient educated regarding when to follow-up if pain, edema, or ecchymosis increases. Patient is understanding of the plan.     Subjective:       23 y.o. female presents for evaluation of right knee pain.  Onset was on Monday while she was rollerskating.  She slid forward (went down hands first) and eventually sustained a slight trauma to the right knee.  Since that time she has had pain in the anterior knee.  Accompanied by edema and bruising.  She has been able to bear weight and in fact went skating again today, without pain or difficulty.  At this point due to cost she would prefer to avoid an x-ray.    The following portions of the patient's history were reviewed and updated as appropriate: allergies, current medications, past family history,  past medical history, past social history, past surgical history and problem list.    Review of Systems  Pertinent items are noted in HPI.  A 12 point comprehensive review of systems was negative except as noted.      Objective:      /81 (Patient Site: Right Arm, Patient Position: Sitting, Cuff Size: Adult Large)   Pulse (!) 101   Temp 98.2  F (36.8  C) (Oral)   Resp 16   Wt (!) 296 lb (134.3 kg)   SpO2 100%   BMI 42.47 kg/m    General appearance: alert, appears stated age and cooperative  Head: Normocephalic, without obvious abnormality, atraumatic  Extremities: Patient endorses right anterior knee pain, there is a small hematoma present, it is difficult to appreciate if there is a large amount of edema given morbid.  There are no open wounds.  Negative pivot shift test.  Denies pain with flexion or extension. Gait appears normal.   Skin: Skin color, texture, turgor normal. No rashes or lesions  Neurologic: Grossly normal     This note has been dictated using voice recognition software. Any grammatical or context distortions are unintentional and inherent to the software

## 2021-08-14 ENCOUNTER — HEALTH MAINTENANCE LETTER (OUTPATIENT)
Age: 26
End: 2021-08-14

## 2021-10-09 ENCOUNTER — HEALTH MAINTENANCE LETTER (OUTPATIENT)
Age: 26
End: 2021-10-09

## 2021-11-09 ENCOUNTER — OFFICE VISIT (OUTPATIENT)
Dept: FAMILY MEDICINE | Facility: CLINIC | Age: 26
End: 2021-11-09
Payer: COMMERCIAL

## 2021-11-09 VITALS
BODY MASS INDEX: 36.57 KG/M2 | SYSTOLIC BLOOD PRESSURE: 120 MMHG | WEIGHT: 255.44 LBS | OXYGEN SATURATION: 99 % | HEIGHT: 70 IN | DIASTOLIC BLOOD PRESSURE: 66 MMHG | HEART RATE: 80 BPM

## 2021-11-09 DIAGNOSIS — M25.569 ACUTE KNEE PAIN, UNSPECIFIED LATERALITY: Primary | ICD-10-CM

## 2021-11-09 PROCEDURE — 99213 OFFICE O/P EST LOW 20 MIN: CPT | Performed by: FAMILY MEDICINE

## 2021-11-09 RX ORDER — SPIRONOLACTONE 50 MG/1
TABLET, FILM COATED ORAL
COMMUNITY
Start: 2021-09-15 | End: 2024-07-29

## 2021-11-09 RX ORDER — ADAPALENE GEL USP, 0.3% 3 MG/G
GEL TOPICAL
COMMUNITY
Start: 2021-09-15 | End: 2024-04-29

## 2021-11-09 RX ORDER — CLINDAMYCIN PHOSPHATE 10 UG/ML
LOTION TOPICAL
COMMUNITY
Start: 2021-09-15 | End: 2024-04-29

## 2021-11-09 ASSESSMENT — MIFFLIN-ST. JEOR: SCORE: 1983.91

## 2021-11-09 NOTE — PROGRESS NOTES
"Assessment & Plan:       ICD-10-CM    1. Acute knee pain, unspecified laterality  M25.569           We reviewed the likely/potential etiology(ies) for her knee symptoms and we will have her continue with rest, ice, elevation and use of a brace or ace wrap for comfort. She should follow up if her symptoms do not improve over the next few weeks. We reviewed use of OTC analgesics, and she will call or return to clinic with any ongoing or worsening symptoms.        Subjective:          Sudha Arevalo is a 25 year old female who presents with pain involving the knee. Onset was 5 days after she stepped off a curb and hyperextended the leg. Current symptoms include: pain with flexion and extension, but there is no significant swelling or bruising. She denies feeling or hearing any popping. Patient has had no prior knee problems. Evaluation to date: none. Treatment to date: ice, rest, otc ibuprofen.      The following portions of the patient's history were reviewed and updated as appropriate: allergies, current medications, past family history, past medical history, past social history, past surgical history and problem list.      Review of Systems  12 point ROS negative except as noted above      Objective:     [unfilled]/66 (Patient Position: Sitting, Cuff Size: Adult Large)   Pulse 80   Ht 1.778 m (5' 10\")   Wt 115.9 kg (255 lb 7 oz)   LMP 10/18/2021 (Approximate)   SpO2 99%   BMI 36.65 kg/m    GEN: Alert and oriented, NAD, well nourished  SKIN:  Normal skin turgor, no lesions/rashes   HEENT: NC/AT, moist mucous membranes.    NECK: Normal.    CV: Regular rate and rhythm.   LUNGS: Clear. Normal respirations.   EXTREMITY: No edema, cyanosis. Both knees are normal in appearance. There is no joint line tenderness on either side.   NEURO: Grossly normal.        "

## 2021-12-19 ENCOUNTER — NURSE TRIAGE (OUTPATIENT)
Dept: NURSING | Facility: CLINIC | Age: 26
End: 2021-12-19
Payer: COMMERCIAL

## 2021-12-19 NOTE — TELEPHONE ENCOUNTER
Patient states she injured her right leg six weeks ago. States she has been able to do her normal activities however she is still having some discomfort on her. Per guideline, advised patient to be seen within 2-3 days. Caller verbalized understanding. Denies further questions.      Rajendra Simons RN  [facility]       Reason for Disposition    [1] After 3 days AND [2] pain not improved    Additional Information    Negative: Serious injury with multiple fractures (broken bones)    Negative: [1] Major bleeding (e.g., actively dripping or spurting) AND [2] can't be stopped    Negative: Bullet wound, stabbed by knife, or other serious penetrating wound    Negative: Looks like a dislocated joint (crooked or deformed)    Negative: Can't stand (bear weight) or walk    Negative: Sounds like a life-threatening emergency to the triager    Negative: Skin is split open or gaping (or length > 1/2 inch or 12 mm)    Negative: [1] Bleeding AND [2] won't stop after 10 minutes of direct pressure (using correct technique)    Negative: [1] Dirt in the wound AND [2] not removed with 15 minutes of scrubbing    Negative: Sounds like a serious injury to the triager    Negative: [1] SEVERE pain (e.g. excruciating)  AND [2] not improved 2 hours after pain medicine/ice packs    Negative: Suspicious history for the injury    Negative: Large swelling or bruise > 2 inches (5 cm)    Negative: [1] High-risk adult (e.g., age > 60, osteoporosis, chronic steroid use) AND [2] limping    Negative: [1] No prior tetanus shots (or is not fully vaccinated) AND [2] any wound (e.g., cut, scrape)    Negative: [1] HIV positive or severe immunodeficiency (severely weak immune system) AND [2] DIRTY cut or scrape    Negative: [1] Last tetanus shot > 5 years ago AND [2] DIRTY cut or scrape    Negative: [1] Last tetanus shot > 10 years ago AND [2] CLEAN cut or scrape (e.g., object AND skin were clean)    Negative: [1] After 3 days AND [2] still  limping    Protocols used: LEG INJURY-A-AH

## 2022-07-07 ENCOUNTER — NURSE TRIAGE (OUTPATIENT)
Dept: NURSING | Facility: CLINIC | Age: 27
End: 2022-07-07

## 2022-07-07 NOTE — TELEPHONE ENCOUNTER
"  TRIAGE CALL     Patient calling \"Bump on my arm\"     On right forearm  The size: dime size  Underneath th skin - it feel right on the bone  Bump has NOT got worse or better  Bump is a little firmer  Tender to the touch - but is less painful now  Started 3 weeks now  Now Pain 2  /10  Uncomfortable when touch by hard surfaces  Numbness or tingling None   Medications/measures taken none   Denies difficulty with work or sleep  Pt s PCP/Clinic: NO at this time  Per protocol, disposition to f/up appt  -   Care advise given and caller s questions were answered  Reminded we will be here 24/7 and can call back and ask to speak with a nurse. Lindsey Campbell RN [facility],  6:31 PM 7/7/2022    Reason for Disposition    Arm pain is a chronic symptom (recurrent or ongoing AND present > 4 weeks)    Protocols used: ARM PAIN-A-AH      "

## 2022-08-18 ENCOUNTER — OFFICE VISIT (OUTPATIENT)
Dept: INTERNAL MEDICINE | Facility: CLINIC | Age: 27
End: 2022-08-18
Payer: COMMERCIAL

## 2022-08-18 VITALS
BODY MASS INDEX: 36.65 KG/M2 | DIASTOLIC BLOOD PRESSURE: 68 MMHG | SYSTOLIC BLOOD PRESSURE: 120 MMHG | OXYGEN SATURATION: 98 % | HEIGHT: 70 IN | HEART RATE: 71 BPM

## 2022-08-18 DIAGNOSIS — Z12.4 ENCOUNTER FOR PAPANICOLAOU SMEAR FOR CERVICAL CANCER SCREENING: Primary | ICD-10-CM

## 2022-08-18 PROCEDURE — G0145 SCR C/V CYTO,THINLAYER,RESCR: HCPCS | Performed by: INTERNAL MEDICINE

## 2022-08-18 PROCEDURE — 87624 HPV HI-RISK TYP POOLED RSLT: CPT | Performed by: INTERNAL MEDICINE

## 2022-08-18 PROCEDURE — 99203 OFFICE O/P NEW LOW 30 MIN: CPT | Performed by: INTERNAL MEDICINE

## 2022-08-18 NOTE — PROGRESS NOTES
"  Assessment & Plan     Encounter for Papanicolaou smear for cervical cancer screening  25 yo female, is here today for the first GYN exam with PAP smear. She was sexually active in the past. LMP was 8/4/22. She denies increase discharge, bleeding in between the cycles, abdominal pain.  - Gynecologic Cytology (PAP Smear); Future    [id number]}     BMI:   Estimated body mass index is 36.65 kg/m  as calculated from the following:    Height as of this encounter: 1.778 m (5' 10\").    Weight as of 11/9/21: 115.9 kg (255 lb 7 oz).           Return in about 1 year (around 8/18/2023) for Routine preventive.    Leela Green MD  [facility]    Von Haley is a 26 year old, presenting for the following health issues:  Gyn Exam (Pap only. )      History of Present Illness       Reason for visit:  Pap smear              Review of Systems         Objective    /68   Pulse 71   Ht 1.778 m (5' 10\")   LMP 08/04/2022 (Approximate)   SpO2 98%   Breastfeeding No   BMI 36.65 kg/m    Body mass index is 36.65 kg/m .  Physical Exam   Abdomen: Soft, non-tender, bowel sounds active all four quadrants,  no masses, no organomegaly.  Pelvic:Normal external genitalia, speculum exam done, PAP done, cervix normal, bimanual exam showed no adnexal tenderness.        .  ..  "

## 2022-08-24 LAB
BKR LAB AP GYN ADEQUACY: NORMAL
BKR LAB AP GYN INTERPRETATION: NORMAL
BKR LAB AP HPV REFLEX: NORMAL
BKR LAB AP LMP: NORMAL
BKR LAB AP PREVIOUS ABNORMAL: NORMAL
PATH REPORT.COMMENTS IMP SPEC: NORMAL
PATH REPORT.COMMENTS IMP SPEC: NORMAL
PATH REPORT.RELEVANT HX SPEC: NORMAL

## 2022-08-26 LAB
HUMAN PAPILLOMA VIRUS 16 DNA: NEGATIVE
HUMAN PAPILLOMA VIRUS 18 DNA: NEGATIVE
HUMAN PAPILLOMA VIRUS FINAL DIAGNOSIS: NORMAL
HUMAN PAPILLOMA VIRUS OTHER HR: NEGATIVE

## 2022-09-11 ENCOUNTER — HEALTH MAINTENANCE LETTER (OUTPATIENT)
Age: 27
End: 2022-09-11

## 2022-11-15 ENCOUNTER — NURSE TRIAGE (OUTPATIENT)
Dept: NURSING | Facility: CLINIC | Age: 27
End: 2022-11-15

## 2022-11-16 NOTE — TELEPHONE ENCOUNTER
"Reports knee injury 1 year ago. Had virtual visit w/ family med doctor, P.T. a few weeks. Pain improved but never went away. Still has pain w/ deep knee bends and when putting all her weight on that leg.Little to no pain w/ regular walking or standing. No redness or swelling. No limping. Ibuprofen helpful. She works out 4-5x a week and says she never really slowed down during the injury. Advised see provider w/i 3 days. - maybe try one of the ortho urgent care/walk in. [facility] and other ortho groups have this service at select locations. Pt voiced understanding and agreement.       Reason for Disposition    [1] After 3 days AND [2] pain not improved    Additional Information    Negative: Serious injury with multiple fractures (broken bones)    Negative: [1] Major bleeding (e.g., actively dripping or spurting) AND [2] can't be stopped    Negative: Looks like a dislocated joint or knee cap (crooked or deformed)    Negative: Bullet wound, stabbed by knife, or other serious penetrating wound    Negative: Sounds like a life-threatening emergency to the triager    Negative: Wound looks infected    Negative: Can't stand (bear weight) or walk    Negative: Skin is split open or gaping (or length > 1/2 inch or 12 mm)    Negative: [1] Bleeding AND [2] won't stop after 10 minutes of direct pressure (using correct technique)    Negative: [1] Dirt in the wound AND [2] not removed with 15 minutes of scrubbing    Negative: Sounds like a serious injury to the triager    Negative: [1] SEVERE pain AND [2] not improved 2 hours after pain medicine/ice packs    Negative: Suspicious history for the injury    Negative: [1] High-risk adult (e.g., age > 60 years, osteoporosis, chronic steroid use) AND [2] limping    Negative: A \"snap\" or \"pop\" was heard at the time of injury    Negative: Large swelling or bruise (> 2 inches or 5 cm)    Negative: [1] No prior tetanus shots (or is not fully vaccinated) AND [2] any wound (e.g., cut, scrape)    " Negative: [1] HIV positive or severe immunodeficiency (severely weak immune system) AND [2] DIRTY cut or scrape    Negative: [1] Last tetanus shot > 5 years ago AND [2] DIRTY cut or scrape    Negative: [1] Last tetanus shot > 10 years ago AND [2] CLEAN cut or scrape (e.g., object AND skin were clean)    Negative: [1] Limp when walking AND [2] due to a twisted knee    Negative: [1] Limp when walking AND [2] due to a direct blow    Negative: Knee giving way (or buckling) when walking    Negative: Knee feels like it is locking (i.e., joint gets stuck, catching)    Protocols used: KNEE INJURY-A-AH

## 2023-10-07 ENCOUNTER — HEALTH MAINTENANCE LETTER (OUTPATIENT)
Age: 28
End: 2023-10-07

## 2024-04-28 ASSESSMENT — PATIENT HEALTH QUESTIONNAIRE - PHQ9
10. IF YOU CHECKED OFF ANY PROBLEMS, HOW DIFFICULT HAVE THESE PROBLEMS MADE IT FOR YOU TO DO YOUR WORK, TAKE CARE OF THINGS AT HOME, OR GET ALONG WITH OTHER PEOPLE: VERY DIFFICULT
SUM OF ALL RESPONSES TO PHQ QUESTIONS 1-9: 13
SUM OF ALL RESPONSES TO PHQ QUESTIONS 1-9: 13

## 2024-04-29 ENCOUNTER — OFFICE VISIT (OUTPATIENT)
Dept: FAMILY MEDICINE | Facility: CLINIC | Age: 29
End: 2024-04-29
Payer: COMMERCIAL

## 2024-04-29 VITALS
OXYGEN SATURATION: 99 % | TEMPERATURE: 97.4 F | HEIGHT: 70 IN | BODY MASS INDEX: 36.65 KG/M2 | DIASTOLIC BLOOD PRESSURE: 68 MMHG | RESPIRATION RATE: 18 BRPM | SYSTOLIC BLOOD PRESSURE: 120 MMHG | HEART RATE: 78 BPM

## 2024-04-29 DIAGNOSIS — F41.1 GAD (GENERALIZED ANXIETY DISORDER): Primary | ICD-10-CM

## 2024-04-29 DIAGNOSIS — F32.0 CURRENT MILD EPISODE OF MAJOR DEPRESSIVE DISORDER WITHOUT PRIOR EPISODE (H): ICD-10-CM

## 2024-04-29 PROCEDURE — 96127 BRIEF EMOTIONAL/BEHAV ASSMT: CPT

## 2024-04-29 PROCEDURE — 99213 OFFICE O/P EST LOW 20 MIN: CPT

## 2024-04-29 PROCEDURE — G2211 COMPLEX E/M VISIT ADD ON: HCPCS

## 2024-04-29 ASSESSMENT — ANXIETY QUESTIONNAIRES
1. FEELING NERVOUS, ANXIOUS, OR ON EDGE: SEVERAL DAYS
4. TROUBLE RELAXING: MORE THAN HALF THE DAYS
3. WORRYING TOO MUCH ABOUT DIFFERENT THINGS: MORE THAN HALF THE DAYS
8. IF YOU CHECKED OFF ANY PROBLEMS, HOW DIFFICULT HAVE THESE MADE IT FOR YOU TO DO YOUR WORK, TAKE CARE OF THINGS AT HOME, OR GET ALONG WITH OTHER PEOPLE?: VERY DIFFICULT
IF YOU CHECKED OFF ANY PROBLEMS ON THIS QUESTIONNAIRE, HOW DIFFICULT HAVE THESE PROBLEMS MADE IT FOR YOU TO DO YOUR WORK, TAKE CARE OF THINGS AT HOME, OR GET ALONG WITH OTHER PEOPLE: VERY DIFFICULT
5. BEING SO RESTLESS THAT IT IS HARD TO SIT STILL: NOT AT ALL
6. BECOMING EASILY ANNOYED OR IRRITABLE: SEVERAL DAYS
7. FEELING AFRAID AS IF SOMETHING AWFUL MIGHT HAPPEN: NOT AT ALL
GAD7 TOTAL SCORE: 8
GAD7 TOTAL SCORE: 8
7. FEELING AFRAID AS IF SOMETHING AWFUL MIGHT HAPPEN: NOT AT ALL
2. NOT BEING ABLE TO STOP OR CONTROL WORRYING: MORE THAN HALF THE DAYS

## 2024-04-29 ASSESSMENT — PAIN SCALES - GENERAL: PAINLEVEL: NO PAIN (0)

## 2024-04-29 NOTE — PROGRESS NOTES
Assessment & Plan     ALIA (generalized anxiety disorder) and Current mild episode of major depressive disorder without prior episode (H24)  Referral placed for mental health therapy and psychiatry as work only allows 8 sessions.  Will evaluate if therapy is sufficient or need to start medication management.  Leave of absence form completed for dates 5/13/2024 to 6/13/2024 and faxed.  - Adult Mental Health  Referral; Future  - Adult Mental Health  Referral; Future    Depression Screening Follow Up  Will follow up in 3 months after evaluation from psychiatry and therapy.    Follow Up Actions Taken  Crisis resource information provided in After Visit Summary  Mental Health Referral placed     Von Haley is a 28 year old, presenting for the following health issues:  Establish Care and Forms (Leave of absence form )        11/9/2021     2:55 PM   Additional Questions   Roomed by Jatin Mckinney     History of Present Illness       Reason for visit:  Physical, mental, emotional health    She eats 0-1 servings of fruits and vegetables daily.She consumes 0 sweetened beverage(s) daily.She exercises with enough effort to increase her heart rate 30 to 60 minutes per day.  She exercises with enough effort to increase her heart rate 5 days per week.      Patient is a 28-year-old female presenting for leave of absence from work for mental health.  Medical history includes general anxiety disorder, depression, and acne.  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  Currently in hybrid position.    Started seeing mental health specialist in 2020 for ALIA and depression through work: [facility]- [employer] [facility].  Work allows 8 sessions per mental health diagnosis.  Reports poor work culture and demanding work load, has been working long hours.  Has to support a lot of colleagues but does not have support herself.  Has spoken to manager related to  "concerns of no support but has not received additional support.  Has been experiencing insomnia related to work burn out and anxiety related to work.  Reports it hard to get out bed and become motivated.  Does reports feeling hopeless.  Currently not on any ALIA or depression medications.  At this time she is unsure if she would like to start medication management.  Denies self-harm or suicidal ideation.  Has been trying to establish night time routines and work boundaries.  Has good family and social support.  Patient is looking to take off 5/13/2024 to 6/13/2024 and has the PTO to cover the time off.  Goal with time off is rest and rejuvenation.          4/29/2024     8:54 AM   ALIA-7 SCORE   Total Score 8 (mild anxiety)   Total Score 8         4/28/2024    11:19 AM   PHQ   PHQ-9 Total Score 13   Q9: Thoughts of better off dead/self-harm past 2 weeks Not at all      Review of Systems  Review of systems negative otherwise known HPI.    Past Medical History:   Diagnosis Date    Amblyopia     Wore eye patch as a child    Strabismus      No past surgical history on file.    Family History   Problem Relation Age of Onset    Amblyopia No family hx of     Glaucoma No family hx of     Macular Degeneration No family hx of      Social History     Tobacco Use    Smoking status: Never    Smokeless tobacco: Never   Substance Use Topics    Alcohol use: Not on file     Current Outpatient Medications   Medication Sig Dispense Refill    metroNIDAZOLE (METROCREAM) 0.75 % external cream       spironolactone (ALDACTONE) 50 MG tablet        No current facility-administered medications for this visit.       Objective    /68 (BP Location: Right arm, Patient Position: Sitting, Cuff Size: Adult Large)   Pulse 78   Temp 97.4  F (36.3  C) (Temporal)   Resp 18   Ht 1.778 m (5' 10\")   LMP 04/08/2024 (Approximate)   SpO2 99%   BMI 36.65 kg/m    Body mass index is 36.65 kg/m .  Physical Exam   General: Alert, oriented, no acute " distress.    Respiratory: Easy work of breathing.   Cardiovascular: Appears warm and well-perfused.    Skin: No abnormalities noted on visualized skin.   Neurologic: Mentation intact and speech normal.     Psychiatric: Appropriate affect.     Signed Electronically by: DUANE Guerrier CNP

## 2024-05-07 ENCOUNTER — VIRTUAL VISIT (OUTPATIENT)
Dept: PSYCHOLOGY | Facility: CLINIC | Age: 29
End: 2024-05-07
Payer: COMMERCIAL

## 2024-05-07 DIAGNOSIS — F43.23 ADJUSTMENT DISORDER WITH MIXED ANXIETY AND DEPRESSED MOOD: Primary | ICD-10-CM

## 2024-05-07 PROCEDURE — 90834 PSYTX W PT 45 MINUTES: CPT | Mod: 95

## 2024-05-07 ASSESSMENT — COLUMBIA-SUICIDE SEVERITY RATING SCALE - C-SSRS
2. HAVE YOU ACTUALLY HAD ANY THOUGHTS OF KILLING YOURSELF?: NO
1. HAVE YOU WISHED YOU WERE DEAD OR WISHED YOU COULD GO TO SLEEP AND NOT WAKE UP?: NO
ATTEMPT LIFETIME: NO
TOTAL  NUMBER OF INTERRUPTED ATTEMPTS LIFETIME: NO
TOTAL  NUMBER OF ABORTED OR SELF INTERRUPTED ATTEMPTS LIFETIME: NO
6. HAVE YOU EVER DONE ANYTHING, STARTED TO DO ANYTHING, OR PREPARED TO DO ANYTHING TO END YOUR LIFE?: NO

## 2024-05-07 ASSESSMENT — PATIENT HEALTH QUESTIONNAIRE - PHQ9
SUM OF ALL RESPONSES TO PHQ QUESTIONS 1-9: 11
10. IF YOU CHECKED OFF ANY PROBLEMS, HOW DIFFICULT HAVE THESE PROBLEMS MADE IT FOR YOU TO DO YOUR WORK, TAKE CARE OF THINGS AT HOME, OR GET ALONG WITH OTHER PEOPLE: VERY DIFFICULT
SUM OF ALL RESPONSES TO PHQ QUESTIONS 1-9: 11

## 2024-05-07 NOTE — PROGRESS NOTES
[facility]   Mental Health & Addiction Services     Progress Note - Initial Visit    Patient  Name:  Sudha Arevalo Date: 5/7/2024         Service Type: Individual     Visit Start Time: 1:26pm  Visit End Time: 2:18pm    Visit #:  1    Attendees: Client    Service Modality:  Video Visit:      Provider verified identity through the following two step process.  Patient provided:  Patient was verified at admission/transfer    Telemedicine Visit: The patient's condition can be safely assessed and treated via synchronous audio and visual telemedicine encounter.      Reason for Telemedicine Visit: Services only offered telehealth    Originating Site (Patient Location): Patient's home    Distant Site (Provider Location): Provider Remote Setting- Home Office    Consent:  The patient/guardian has verbally consented to: the potential risks and benefits of telemedicine (video visit) versus in person care; bill my insurance or make self-payment for services provided; and responsibility for payment of non-covered services.     Patient would like the video invitation sent by:  My Chart    Mode of Communication:  Video Conference via Amwell    Distant Location (Provider):  Off-site    As the provider I attest to compliance with applicable laws and regulations related to telemedicine.       DATA:   Interactive Complexity: No   Crisis: No     Presenting Concerns/  Current Stressors:   Patient presents with stressors of anxiety and depression with functional impairments of physical symptoms such as nausea and muscle tension, feelings of low self-worth and self-esteem and rumination. Patient has struggled with symptoms off and on since her childhood. Patient also identified workplace stressors impacting symptoms.       ASSESSMENT:  Mental Status Assessment:  Appearance:   Appropriate   Eye Contact:   Good   Psychomotor Behavior: Normal   Attitude:   Pleasant  Orientation:   All  Speech   Rate /  Production: Normal/ Responsive   Volume:  Normal   Mood:    Anxious  Depressed   Affect:    Appropriate   Thought Content:  Clear  Rumination   Thought Form:  Coherent  Logical  [place]  Insight:    Good     Assessments completed prior to this visit:  The following assessments were completed by patient for this visit:  PHQ9:       4/28/2024    11:19 AM 5/7/2024     2:02 AM   PHQ-9 SCORE   PHQ-9 Total Score MyChart 13 (Moderate depression) 11 (Moderate depression)   PHQ-9 Total Score 13 11     GAD7:       4/29/2024     8:54 AM   ALIA-7 SCORE   Total Score 8 (mild anxiety)   Total Score 8     CAGE-AID:       5/7/2024     2:20 AM   CAGE-AID Total Score   Total Score 0   Total Score MyChart 0 (A total score of 2 or greater is considered clinically significant)     PROMIS 10-Global Health (only subscores and total score):       5/7/2024     2:20 AM   PROMIS-10 Scores Only   Global Mental Health Score 10   Global Physical Health Score 14   PROMIS TOTAL - SUBSCORES 24         Safety Issues and Plan for Safety and Risk Management:   [place] Suicide Severity Rating Scale (Lifetime/Recent)      5/7/2024     1:35 PM   [place] Suicide Severity Rating (Lifetime/Recent)   Q1 Wish to be Dead (Lifetime) N   Q2 Non-Specific Active Suicidal Thoughts (Lifetime) N   Actual Attempt (Lifetime) N   Has subject engaged in non-suicidal self-injurious behavior? (Lifetime) N   Interrupted Attempts (Lifetime) N   Aborted or Self-Interrupted Attempt (Lifetime) N   Preparatory Acts or Behavior (Lifetime) N   Calculated C-SSRS Risk Score (Lifetime/Recent) No Risk Indicated     Patient denies current fears or concerns for personal safety.  Patient denies current or recent suicidal ideation or behaviors.  Patient denies current or recent homicidal ideation or behaviors.  Patient denies current or recent self injurious behavior or ideation.  Patient denies other safety concerns.  Recommended that patient call 911 or go to the local ED should there  be a change in any of these risk factors.  Patient reports there are no firearms in the house.     Diagnostic Criteria:  Adjustment Disorder  A. The development of emotional or behavioral symptoms in response to an identifiable stressor(s) occurring within 3 months of the onset of the stressor(s)  B. These symptoms or behaviors are clinically significant, as evidenced by one or both of the following:  C. The stress-related disturbance does not meet criteria for another disorder & is not not an exacerbation of another mental disorder  D. The symptoms do not represent normal bereavement  E. Once the stressor or its consequences have terminated, the symptoms do not persist for more than an additional 6 months       * Adjustment Disorder with Mixed Anxiety and Depressed Mood: The predominant manifestation is a combination of depression and anxiety      DSM5 Diagnoses: (Sustained by DSM5 Criteria Listed Above)  Diagnoses: Adjustment Disorders  309.28 (F43.23) With mixed anxiety and depressed mood  Psychosocial & Contextual Factors: Symptoms of anxiety and depression with functional impairments of physical symptoms such as nausea and muscle tension, feelings of low self-worth and self-esteem and rumination. Patient has struggled with symptoms off and on since her childhood. Workplace stressors.     Intervention:   Psychodynamic- Patient processed internal experiences   Collateral Reports Completed:  Not Applicable      PLAN: (Homework, other):  1. Provider will continue Diagnostic Assessment.  Patient was given the following to do until next session:  engage in self-care coping skill and work to identify goals for therapy. Next session 5/21/24.    2. Provider recommended the following referrals: none.      3.  Suicide Risk and Safety Concerns were assessed for Sudha Arevalo.    Patient meets the following risk assessment and triage: Patient denied any current/recent/lifetime history of suicidal ideation and/or  behaviors.  No safety plan indicated at this time.       MALGORZATA Abbasi  May 7, 2024  Service Performed and Documented by LGSW-   Note reviewed and clinical supervision by MALGORZATA Pollack [facility] 5/13/2024

## 2024-05-21 ENCOUNTER — VIRTUAL VISIT (OUTPATIENT)
Dept: PSYCHOLOGY | Facility: CLINIC | Age: 29
End: 2024-05-21
Payer: COMMERCIAL

## 2024-05-21 DIAGNOSIS — F43.23 ADJUSTMENT DISORDER WITH MIXED ANXIETY AND DEPRESSED MOOD: Primary | ICD-10-CM

## 2024-05-21 PROCEDURE — 90837 PSYTX W PT 60 MINUTES: CPT | Mod: 95

## 2024-05-21 NOTE — PROGRESS NOTES
[facility]                                     Progress Note    Patient Name: Sudha Arevalo  Date: 5/21/2024         Service Type: Individual      Session Start Time: 2:35pm  Session End Time: 3:33pm     Session Length: 53 OR > minutes    Session #: 2    Attendees: Client    Service Modality:  Video Visit:      Provider verified identity through the following two step process.  Patient provided:  Patient is known previously to provider    Telemedicine Visit: The patient's condition can be safely assessed and treated via synchronous audio and visual telemedicine encounter.      Reason for Telemedicine Visit: Services only offered telehealth    Originating Site (Patient Location): Patient's home    Distant Site (Provider Location): Provider Remote Setting- Home Office    Consent:  The patient/guardian has verbally consented to: the potential risks and benefits of telemedicine (video visit) versus in person care; bill my insurance or make self-payment for services provided; and responsibility for payment of non-covered services.     Patient would like the video invitation sent by:  My Chart    Mode of Communication:  Video Conference via Amwell    Distant Location (Provider):  Off-site    As the provider I attest to compliance with applicable laws and regulations related to telemedicine.    DATA  Interactive Complexity: No  Crisis: No        Progress Since Last Session (Related to Symptoms / Goals / Homework):   Symptoms: No change per patient reporting    Homework:  Assigned      Episode of Care Goals: Minimal progress - PREPARATION (Decided to change - considering how); Intervened by negotiating a change plan and determining options / strategies for behavior change, identifying triggers, exploring social supports, and working towards setting a date to begin behavior change     Current / Ongoing Stressors and Concerns:   Patient presents with stressors of anxiety and depression with  functional impairments of physical symptoms such as nausea and muscle tension, feelings of low self-worth and self-esteem and rumination. Patient has struggled with symptoms off and on since her childhood. Patient also identified workplace stressors impacting symptoms.      Treatment Objective(s) Addressed in This Session:   Not yet established      Intervention:   Psychodynamic: Patient processed internal experiences as they joined with therapist to work toward completion of diagnostic assessment.    Assessments completed prior to visit:  The following assessments were completed by patient for this visit:  PHQ9:       4/28/2024    11:19 AM 5/7/2024     2:02 AM   PHQ-9 SCORE   PHQ-9 Total Score MyChart 13 (Moderate depression) 11 (Moderate depression)   PHQ-9 Total Score 13 11     GAD7:       4/29/2024     8:54 AM   ALIA-7 SCORE   Total Score 8 (mild anxiety)   Total Score 8     PROMIS 10-Global Health (only subscores and total score):       5/7/2024     2:20 AM   PROMIS-10 Scores Only   Global Mental Health Score 10   Global Physical Health Score 14   PROMIS TOTAL - SUBSCORES 24     [place] Suicide Severity Rating Scale (Lifetime/Recent)      5/7/2024     1:35 PM   [place] Suicide Severity Rating (Lifetime/Recent)   Q1 Wish to be Dead (Lifetime) N   Q2 Non-Specific Active Suicidal Thoughts (Lifetime) N   Actual Attempt (Lifetime) N   Has subject engaged in non-suicidal self-injurious behavior? (Lifetime) N   Interrupted Attempts (Lifetime) N   Aborted or Self-Interrupted Attempt (Lifetime) N   Preparatory Acts or Behavior (Lifetime) N   Calculated C-SSRS Risk Score (Lifetime/Recent) No Risk Indicated         ASSESSMENT: Current Emotional / Mental Status (status of significant symptoms):   Risk status (Self / Other harm or suicidal ideation)   Patient denies current fears or concerns for personal safety.   Patient denies current or recent suicidal ideation or behaviors.   Patient denies current or recent homicidal  ideation or behaviors.   Patient denies current or recent self injurious behavior or ideation.   Patient denies other safety concerns.   Patient reports there has been no change in risk factors since their last session.     Patient reports there has been no change in protective factors since their last session.     Recommended that patient call 911 or go to the local ED should there be a change in any of these risk factors.     Appearance:   Appropriate    Eye Contact:   Good    Psychomotor Behavior: Normal    Attitude:   Friendly Pleasant   Orientation:   All   Speech    Rate / Production: Normal     Volume:  Normal    Mood:    Anxious  Depressed    Affect:    Appropriate    Thought Content:  Clear    Thought Form:  Coherent  Logical  [place]   Insight:    Good      Medication Review:   No current psychiatric medications prescribed     Medication Compliance:   NA     Changes in Health Issues:   None reported     Chemical Use Review:   Substance Use: Chemical use reviewed, no active concerns identified      Tobacco Use: No current tobacco use.      Diagnosis:  1. Adjustment disorder with mixed anxiety and depressed mood        Collateral Reports Completed:   Not Applicable    PLAN: (Patient Tasks / Therapist Tasks / Other)  Patient to continue to engage in self-care coping skill of planned rest day. Next session 6/4/24.        MALGORZATA Abbasi 5/21/24

## 2024-06-03 ASSESSMENT — ANXIETY QUESTIONNAIRES
GAD7 TOTAL SCORE: 6
7. FEELING AFRAID AS IF SOMETHING AWFUL MIGHT HAPPEN: NOT AT ALL
3. WORRYING TOO MUCH ABOUT DIFFERENT THINGS: SEVERAL DAYS
4. TROUBLE RELAXING: SEVERAL DAYS
GAD7 TOTAL SCORE: 6
1. FEELING NERVOUS, ANXIOUS, OR ON EDGE: MORE THAN HALF THE DAYS
GAD7 TOTAL SCORE: 6
5. BEING SO RESTLESS THAT IT IS HARD TO SIT STILL: NOT AT ALL
7. FEELING AFRAID AS IF SOMETHING AWFUL MIGHT HAPPEN: NOT AT ALL
3. WORRYING TOO MUCH ABOUT DIFFERENT THINGS: SEVERAL DAYS
2. NOT BEING ABLE TO STOP OR CONTROL WORRYING: SEVERAL DAYS
2. NOT BEING ABLE TO STOP OR CONTROL WORRYING: SEVERAL DAYS
6. BECOMING EASILY ANNOYED OR IRRITABLE: SEVERAL DAYS
1. FEELING NERVOUS, ANXIOUS, OR ON EDGE: MORE THAN HALF THE DAYS
GAD7 TOTAL SCORE: 6
6. BECOMING EASILY ANNOYED OR IRRITABLE: SEVERAL DAYS
4. TROUBLE RELAXING: SEVERAL DAYS
GAD7 TOTAL SCORE: 6
7. FEELING AFRAID AS IF SOMETHING AWFUL MIGHT HAPPEN: NOT AT ALL
GAD7 TOTAL SCORE: 6
7. FEELING AFRAID AS IF SOMETHING AWFUL MIGHT HAPPEN: NOT AT ALL
5. BEING SO RESTLESS THAT IT IS HARD TO SIT STILL: NOT AT ALL

## 2024-06-03 ASSESSMENT — PATIENT HEALTH QUESTIONNAIRE - PHQ9
SUM OF ALL RESPONSES TO PHQ QUESTIONS 1-9: 5

## 2024-06-04 ENCOUNTER — VIRTUAL VISIT (OUTPATIENT)
Dept: PSYCHOLOGY | Facility: CLINIC | Age: 29
End: 2024-06-04
Payer: COMMERCIAL

## 2024-06-04 DIAGNOSIS — F33.0 MILD EPISODE OF RECURRENT MAJOR DEPRESSIVE DISORDER (H): Primary | ICD-10-CM

## 2024-06-04 DIAGNOSIS — F41.1 GAD (GENERALIZED ANXIETY DISORDER): ICD-10-CM

## 2024-06-04 PROCEDURE — 90791 PSYCH DIAGNOSTIC EVALUATION: CPT | Mod: 95

## 2024-06-04 NOTE — PROGRESS NOTES
"[facility]         PATIENT'S NAME: Sudha Arevalo  PREFERRED NAME: Sudha  PRONOUNS: She/Her/Hers  MRN: [id number]  : 1995  ADDRESS: [address] [place] [id number]  [facility] NUM[facility]:  [id number]  DATE OF SERVICE: 2024  START TIME: 3:37pm  END TIME: 4:44m  PREFERRED PHONE: [phone]  May we leave a program related message: Yes  EMERGENCY CONTACT: was obtained Mother Jessenia Arevalo ([phone])  SERVICE MODALITY:  Video Visit:      Provider verified identity through the following two step process.  Patient provided:  Patient is known previously to provider    Telemedicine Visit: The patient's condition can be safely assessed and treated via synchronous audio and visual telemedicine encounter.      Reason for Telemedicine Visit: Services only offered telehealth    Originating Site (Patient Location): Patient's home    Distant Site (Provider Location): Provider Remote Setting- Home Office    Consent:  The patient/guardian has verbally consented to: the potential risks and benefits of telemedicine (video visit) versus in person care; bill my insurance or make self-payment for services provided; and responsibility for payment of non-covered services.     Patient would like the video invitation sent by:  My Chart    Mode of Communication:  [facility]    Distant Location (Provider):  Off-site    As the provider I attest to compliance with applicable laws and regulations related to telemedicine.    UNIVERSAL ADULT Mental Health DIAGNOSTIC ASSESSMENT    Identifying Information:  Patient is a 28 year old,  \"prefer not to say\"  other individual.  Patient was referred for an assessment by primary care provider.  Patient attended the session alone.    Chief Complaint:   The reason for seeking services at this time is: \"Anxiety, depression, burnout\".  The problem(s) began 10/01/23.    Patient has attempted to resolve these concerns in the past " "through therapy .    Social/Family History:  Patient reported they grew up in [place], [place].  They were raised by biological parents  .  Parents were always together.  Patient reported that their childhood was \"the stereotypical American Dream; I was clothed and fed. Family was close; parents keep us engaged in activities together and family trips. [place]. Family dinner was where we spent the most time. Grew up in [facility] community - I was frequently the only Black child in my classes's and some of my grade until I go to [employer]; struggles with fitting in, being ostracized; struggled with having to hide parts of myself \" Patient described their current relationships with family of origin as \"close with mother and brother; maintain connection with each other. Father is no longer in the picture. Parents . Father abandoned family 2018\".     The patient describes their cultural background as other.  Cultural influences and impact on patient's life structure, values, norms, and healthcare: NA.  Contextual influences on patient's health include: Contextual Factors: Individual Factors work stressors - dealing with increased pressures at work making it hard to say 'no' for taking more tasks/work projects; not receiving proper compensation; lack of support.    These factors will be addressed in the Preliminary Treatment plan. Patient identified their preferred language to be English. Patient reported they does not need the assistance of an  or other support involved in therapy.     Patient reported had no significant delays in developmental tasks, patient's mother had complications during delivery, patient was not impacted as a baby. .   Patient's highest education level was college graduate  .  Patient identified the following learning problems: none reported.  Modifications will not be used to assist communication in therapy.  Patient reports they " are  able to understand written materials.    Patient reported the following relationship history;  Patient's current relationship status is single.   Patient identified their sexual orientation as heterosexual.  Patient reported having   child(cassia). Patient identified mother; siblings; friends as part of their support system.  Patient identified the quality of these relationships as good,  .      Patient's current living/housing situation involves staying in own home/apartment.  The immediate members of family and household include NA, NA,NA  and they report that housing is stable.    Patient is currently employed fulltime.  Patient reports their finances are obtained through employment. Patient does identify finances as a current stressor.      Patient reported that they have not been involved with the legal system.   Patient does not report being under probation/ parole/ jurisdiction. They are not under any current court jurisdiction. .    Patient's Strengths and Limitations:  Patient identified the following strengths or resources that will help them succeed in treatment: commitment to health and well being, kelsea / spirituality, friends / good social support, insight, intelligence, motivation, strong social skills, and work ethic. Things that may interfere with the patient's success in treatment include: place of work is an unsupportive environment.    Assessments:  The following assessments were completed by patient for this visit:  PHQ9:       4/28/2024    11:19 AM 5/7/2024     2:02 AM 6/3/2024     3:04 PM   PHQ-9 SCORE   PHQ-9 Total Score MyChart 13 (Moderate depression) 11 (Moderate depression) 5 (Mild depression)   PHQ-9 Total Score 13 11 5    5     GAD7:       4/29/2024     8:54 AM 6/3/2024     3:04 PM   ALIA-7 SCORE   Total Score 8 (mild anxiety) 6 (mild anxiety)   Total Score 8 6    6     CAGE-AID:       5/7/2024     2:20 AM   CAGE-AID Total Score   Total Score 0   Total Score MyChart 0 (A total score of  2 or greater is considered clinically significant)     [place] Suicide Severity Rating Scale (Lifetime/Recent)      5/7/2024     1:35 PM   [place] Suicide Severity Rating (Lifetime/Recent)   Q1 Wish to be Dead (Lifetime) N   Q2 Non-Specific Active Suicidal Thoughts (Lifetime) N   Actual Attempt (Lifetime) N   Has subject engaged in non-suicidal self-injurious behavior? (Lifetime) N   Interrupted Attempts (Lifetime) N   Aborted or Self-Interrupted Attempt (Lifetime) N   Preparatory Acts or Behavior (Lifetime) N   Calculated C-SSRS Risk Score (Lifetime/Recent) No Risk Indicated       Personal and Family Medical History:  Patient does not report a family history of mental health concerns.  Patient reports family history is not on file..     Patient does report Mental Health Diagnosis and/or Treatment.  Patient reported the following previous diagnoses which include(s): ALIA and MDD.  Patient reported symptoms began 2022.  Patient has received mental health services in the past:     .  Psychiatric Hospitalizations:none.   Patient denies a history of civil commitment.      Currently, patient  is not receiving other mental health services.  These include none.       Patient has not had a physical exam to rule out medical causes for current symptoms.  Date of last physical exam was greater than a year ago and client was encouraged to schedule an exam with PCP. The patient has a [place] [facility] Care Provider, who is [facility]..  Patient reports no current medical concerns.  Patient denies any issues with pain..   There are not significant appetite / nutritional concerns / weight changes.   Patient does not report a history of head injury / trauma / cognitive impairment      Medication Adherence:  Patient reports taking.    Patient Allergies:  No Known Allergies    Medical History:    Past Medical History:   Diagnosis Date    Amblyopia     Wore eye patch as a child    Strabismus           Current Mental Status Exam:   Appearance:  Appropriate    Eye Contact:  Good   Psychomotor:  Normal       Gait / station:  no problem  Attitude / Demeanor: Pleasant  Speech      Rate / Production: Normal/ Responsive      Volume:  Normal  volume      Language:  intact  Mood:   Anxious  Depressed   Affect:   Appropriate    Thought Content: Clear   Thought Process: Coherent       Associations: No loosening of associations  Insight:   Good   Judgment:  Intact   Orientation:  All  Attention/concentration: Good    Substance Use:   Patient did not report a family history of substance use concerns; see medical history section for details.  Patient has not received chemical dependency treatment in the past.  Patient has not ever been to detox.      Patient is not currently receiving any chemical dependency treatment.           Substance History of use Age of first use Date of last use     Pattern and duration of use (include amounts and frequency)   Alcohol used in the past   21 01/01/24 1-2 beverages per month if there is a social occasion.   Cannabis   never used     REPORTS SUBSTANCE USE: N/A     Amphetamines   never used     REPORTS SUBSTANCE USE: N/A   Cocaine/crack    never used       REPORTS SUBSTANCE USE: N/A   Hallucinogens never used         REPORTS SUBSTANCE USE: N/A   Inhalants never used         REPORTS SUBSTANCE USE: N/A   Heroin never used         REPORTS SUBSTANCE USE: N/A   Other Opiates never used     REPORTS SUBSTANCE USE: N/A   Benzodiazepine   never used     REPORTS SUBSTANCE USE: N/A   Barbiturates never used     REPORTS SUBSTANCE USE: N/A   Over the counter meds used in the past NA 04/15/24    Caffeine currently use NA   1-2 cups per week.   Nicotine  never used     REPORTS SUBSTANCE USE: N/AREPORTS SUBSTANCE USE: N/A   Other substances not listed above:  Identify:  never used     REPORTS SUBSTANCE USE: N/A     Patient reported the following problems as a result of their substance use: no  problems, not applicable.    Substance Use: No symptoms    Based on the negative CAGE score and clinical interview there  are not indications of drug or alcohol abuse.    Significant Losses / Trauma / Abuse / Neglect Issues:   Patient did not  serve in the .  There are indications or report of significant loss, trauma, abuse or neglect issues related to: are no indications and client denies any losses, trauma, abuse, or neglect concerns.  Concerns for possible neglect are not present.     Safety Assessment:   Patient denies current homicidal ideation and behaviors.  Patient denies current self-injurious ideation and behaviors.    Patient denied risk behaviors associated with substance use.   Patient denies any high risk behaviors associated with mental health symptoms.  Patient reports the following current concerns for their personal safety: None.  Patient reports there are not firearms in the house.       There are no firearms in the home..    History of Safety Concerns:  Patient denied a history of homicidal ideation.     Patient denied a history of personal safety concerns.    Patient denied a history of assaultive behaviors.    Patient denied a history of sexual assault behaviors.     Patient denied a history of risk behaviors associated with substance use.  Patient denies any history of high risk behaviors associated with mental health symptoms.  Patient reports the following protective factors: forward or future oriented thinking; regular physical activity; purpose; commitment to well being; other    Risk Plan:  See Recommendations for Safety and Risk Management Plan    Review of Symptoms per patient report:   Depression: Change in sleep, Excessive or inappropriate guilt, Change in energy level, Change in appetite, Psychomotor slowing or agitation, Feelings of hopelessness, Feelings of helplessness, Low self-worth, Ruminations, Irritability, Feeling sad, down, or depressed, Withdrawn, and Poor  hygeine  [place]:  No Symptoms  Psychosis: No Symptoms  Anxiety: Excessive worry, Nervousness, Physical complaints, such as headaches, stomachaches, muscle tension, Fears/phobias freeze/avoidance, Sleep disturbance, Ruminations, Poor concentration, Irritability, and fear of being fired for any small misstep.  Panic:  No symptoms  Post Traumatic Stress Disorder:  No Symptoms   Eating Disorder: Past history of restrictive eating   ADD / ADHD:  No symptoms  Conduct Disorder: No symptoms  Autism Spectrum Disorder: No symptoms  Obsessive Compulsive Disorder: No Symptoms    Patient reports the following compulsive behaviors and treatment history: Social Media - has not had to have treatment; provacative in limiting bernardino use and/or deleting apps.    Diagnostic Criteria:   Generalized Anxiety Disorder  B. The person finds it difficult to control the worry.   - Restlessness or feeling keyed up or on edge.    - Difficulty concentrating or mind going blank.    - Muscle tension.    - Sleep disturbance (difficulty falling or staying asleep, or restless unsatisfying sleep).   D. The focus of the anxiety and worry is not confined to features of an Axis I disorder.  E. The anxiety, worry, or physical symptoms cause clinically significant distress or impairment in social, occupational, or other important areas of functioning.   F. The disturbance is not due to the direct physiological effects of a substance (e.g., a drug of abuse, a medication) or a general medical condition (e.g., hyperthyroidism) and does not occur exclusively during a Mood Disorder, a Psychotic Disorder, or a Pervasive Developmental Disorder.    - The aformentioned symptoms began 2 year(s) ago and occurs 5 days per week and is experienced as mild. Major Depressive Disorder  A) Recurrent episode(s) - symptoms have been present during the same 2-week period and represent a change from previous functioning 5 or more symptoms (required for diagnosis)   - Depressed mood.  Note: In children and adolescents, can be irritable mood.     - Difficulty with sleep.    - Fatigue or loss of energy.    - Feelings of worthlessness or inappropriate and excessive guilt.    - Diminished ability to think or concentrate, or indecisiveness.     Functional Status:  Patient reports the following functional impairments:  management of the household and or completion of tasks, organization, self-care, work / vocational responsibilities, and patient experience of bias in the workplace .     Nonprogrammatic care:  Patient is requesting basic services to address current mental health concerns.    Clinical Summary:  1. Psychosocial, Cultural and Contextual Factors: work stressors - dealing with increased pressures at work making it hard to say 'no' for taking more tasks/work projects; not receiving proper compensation; lack of support.    2. Principal DSM5 Diagnoses  (Sustained by DSM5 Criteria Listed Above):   296.31 (F33.0) Major Depressive Disorder, Recurrent Episode, Mild _  300.02 (F41.1) Generalized Anxiety Disorder.  3. Other Diagnoses that is relevant to services:   None.  4. Provisional Diagnosis:  None.  5. Prognosis: Relieve Acute Symptoms and Maintain Current Status / Prevent Deterioration.  6. Likely consequences of symptoms if not treated: Likely symptoms if not treated would result in deterioration in self, family and/or work functioning such as social isolation, suicidal feelings, relationship difficulties, family, school and/or work conflicts and decrease in maintenance of physical health conditions.  7. Client strengths include:  caring, creative, educated, empathetic, employed, goal-focused, insightful, intelligent, motivated, open to learning, open to suggestions / feedback, wants to learn, and willing to ask questions .     Recommendations:     1. Plan for Safety and Risk Management:   Safety and Risk: Recommended that patient call 911 or go to the local ED should there be a change in any  of these risk factors..          Report to child / adult protection services was NA.     2. Patient's identified mental health concerns with a cultural influence will be addressed by Culturally responsive therapy and coping skills.    3. Initial Treatment will focus on:    Depressed Mood - space to process; increase understanding of symptoms as it relates to ongoing struggles with negative self-view and engaging with others .     4. Resources/Service Plan:    services are not indicated.   Modifications to assist communication are not indicated.   Additional disability accommodations are not indicated.      5. Collaboration:   Collaboration / coordination of treatment will be initiated with the following  support professionals: primary care physician.      6.  Referrals:   The following referral(s) will be initiated: Outpatient [facility].       A Release of Information has been obtained for the following:  none .     Clinical Substantiation/medical necessity for the above recommendations:  Prevent deterioration, relieve acute symptoms, and improve functioning    7. TRINA:    TRINA:  Discussed the general effects of drugs and alcohol on health and well-being. Provider gave patient printed information about the effects of chemical use on their health and well being. Recommendations:  none. .     8. Records:   These were not available for review at time of assessment.   Information in this assessment was obtained from the medical record and  provided by patient who is a good historian.    Patient will have open access to their mental health medical record.    9.   Interactive Complexity: No    10. Safety Plan:       Provider Name/ Credentials:  Kareen MCGARRY, LICSW   6/4/2024        ______________________________________________________________________________________________________________________________                                              Individual Treatment Plan    Patient's  "Name: Sudha Arevalo  YOB: 1995    Date of Creation: 6/4/2024 (Due 9/2/24)  Date Treatment Plan Last Reviewed/Revised: n/a    DSM5 Diagnoses: 296.31 (F33.0) Major Depressive Disorder, Recurrent Episode, Mild _ or 300.02 (F41.1) Generalized Anxiety Disorder  Psychosocial / Contextual Factors:  work stressors - dealing with increased pressures at work making it hard to say 'no' for taking more tasks/work projects; not receiving proper compensation; lack of support.    PROMIS (reviewed every 90 days):     Referral / Collaboration:  Referral to another professional/service is not indicated at this time..    Anticipated number of session for this episode of care:  24-32  Anticipation frequency of session: Every other week  Anticipated Duration of each session: 38-52 minutes  Treatment plan will be reviewed in 90 days or when goals have been changed.       MeasurableTreatment Goal(s) related to diagnosis / functional impairment(s)  Goal 1: Patient will work to identify thoughts and emotions related to depression and anxiety.    I will know I've met my goal when I my symptoms have less impact on my sleep.\"     Objective #A (Patient Action)    Patient will Identify negative self-talk and behaviors: challenge core beliefs, myths, and actions.  Status: New - Date: 6/4/24      Intervention(s)  Therapist will teach emotional recognition/identification.     Objective #B  Patient will identify 1-3 fears / thoughts that contribute to feeling anxious.  Status: New - Date: 6/4/24      Intervention(s)  Therapist will teach the client how to perform a behavioral chain analysis.     Objective #C  Patient will identify the time in your life when you began to feel poorly about themselves.  Status: New - Date: 6/4/24      Intervention(s)  Therapist will provide individual therapy to identify triggers to negative self-talk, gain feedback on helpful coping tools and thought-reframing techniques, and identify preferred " way of being. Tx to include discuss current stressors and interpersonal conflicts and how to cope with these.      Patient has reviewed and agreed to the above plan.      MALGORZATA Abbasi  June 4, 2024

## 2024-06-10 ENCOUNTER — OFFICE VISIT (OUTPATIENT)
Dept: FAMILY MEDICINE | Facility: CLINIC | Age: 29
End: 2024-06-10
Payer: COMMERCIAL

## 2024-06-10 VITALS
OXYGEN SATURATION: 100 % | SYSTOLIC BLOOD PRESSURE: 112 MMHG | RESPIRATION RATE: 16 BRPM | TEMPERATURE: 98.4 F | DIASTOLIC BLOOD PRESSURE: 62 MMHG | HEART RATE: 82 BPM

## 2024-06-10 DIAGNOSIS — F41.1 GAD (GENERALIZED ANXIETY DISORDER): Primary | ICD-10-CM

## 2024-06-10 DIAGNOSIS — F32.0 CURRENT MILD EPISODE OF MAJOR DEPRESSIVE DISORDER WITHOUT PRIOR EPISODE (H): ICD-10-CM

## 2024-06-10 PROCEDURE — 99213 OFFICE O/P EST LOW 20 MIN: CPT

## 2024-06-10 ASSESSMENT — PAIN SCALES - GENERAL: PAINLEVEL: NO PAIN (0)

## 2024-06-10 NOTE — PROGRESS NOTES
Assessment & Plan     ALIA (generalized anxiety disorder) &Current mild episode of major depressive disorder without prior episode (H24)  ALIA and PHQ scores have decreased since last visit.  Currently working on treatment plan with therapist as medication management is not desired at this time.  Next appointment 6/13/2024.  Recommended patient to follow-up with self after appointment to know how long to extend FMLA.  Report of workability form at clinic for patient to   once filled out.         Von Haley is a 28 year old, presenting for the following health issues:  Follow Up (Follow up on mental health. Patient has been going to therapy. Currently does not have a treatment plan. Wondering if she should extend the FMLA or return to work. )      6/10/2024    12:48 PM   Additional Questions   Roomed by Roxy DANIELLE CMA     History of Present Illness       Mental Health Follow-up:  Patient presents to follow-up on Depression & Anxiety.Patient's depression since last visit has been:  Better  The patient is not having other symptoms associated with depression.  Patient's anxiety since last visit has been:  Better  The patient is not having other symptoms associated with anxiety.  Any significant life events: No  Patient is not feeling anxious or having panic attacks.  Patient has no concerns about alcohol or drug use.      Patient is a 28-year-old female presenting for mental health follow up.  Medical history includes general anxiety disorder, depression, and acne.  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  FMLA paperwork completed for dates 5/13/2024 to 6/13/2024 at prior visit.     Established with psychiatrist Hafsa Lazar at [facility].  Was decided to not start medication management at this time.  Established with therapist Kareen Flores, has been meeting weekly.  Has found therapy session very helpful and will continue.  Currently working on treatment plan.  Next  appointment 6/13/2024.  Reports anxiety and depression as manageable- mostly due to not working.  Patient states the break has been beneficial for her mental health.  Has been more motivated to do things and getting outside.  Has been connecting with loved ones.  Continues to have feelings isolation and feeling hopeless.  Patient would like to extend FMLA until treatment plan is established.  Patient's vision is to return back to work full time in hybrid position.  Patient states her boss has reached out to her telling her to take care of her mental health.        4/29/2024     8:54 AM 6/3/2024     3:04 PM   ALIA-7 SCORE   Total Score 8 (mild anxiety) 6 (mild anxiety)   Total Score 8 6    6         4/28/2024    11:19 AM 5/7/2024     2:02 AM 6/3/2024     3:04 PM   PHQ   PHQ-9 Total Score 13 11 5    5   Q9: Thoughts of better off dead/self-harm past 2 weeks Not at all Not at all Not at all     Review of Systems  Review of systems negative otherwise known HPI.      Objective    /62 (BP Location: Left arm, Patient Position: Sitting, Cuff Size: Adult Regular)   Pulse 82   Temp 98.4  F (36.9  C) (Oral)   Resp 16   LMP 05/05/2024 (Approximate)   SpO2 100%   There is no height or weight on file to calculate BMI.  Physical Exam   General: Alert, oriented, no acute distress.    Respiratory: Easy work of breathing.   Cardiovascular: Appears warm and well-perfused.    Skin: No abnormalities noted on visualized skin.   Neurologic: Mentation intact and speech normal.     Psychiatric: Appropriate affect.     Signed Electronically by: DUANE Guerrier CNP

## 2024-06-13 ENCOUNTER — VIRTUAL VISIT (OUTPATIENT)
Dept: PSYCHOLOGY | Facility: CLINIC | Age: 29
End: 2024-06-13
Payer: COMMERCIAL

## 2024-06-13 DIAGNOSIS — F41.1 GAD (GENERALIZED ANXIETY DISORDER): Primary | ICD-10-CM

## 2024-06-13 DIAGNOSIS — F33.0 MILD EPISODE OF RECURRENT MAJOR DEPRESSIVE DISORDER (H): ICD-10-CM

## 2024-06-13 PROCEDURE — 90834 PSYTX W PT 45 MINUTES: CPT | Mod: 95

## 2024-06-13 ASSESSMENT — PATIENT HEALTH QUESTIONNAIRE - PHQ9
SUM OF ALL RESPONSES TO PHQ QUESTIONS 1-9: 6
SUM OF ALL RESPONSES TO PHQ QUESTIONS 1-9: 6

## 2024-06-26 ENCOUNTER — VIRTUAL VISIT (OUTPATIENT)
Dept: PSYCHOLOGY | Facility: CLINIC | Age: 29
End: 2024-06-26
Payer: COMMERCIAL

## 2024-06-26 DIAGNOSIS — F41.1 GAD (GENERALIZED ANXIETY DISORDER): Primary | ICD-10-CM

## 2024-06-26 DIAGNOSIS — F33.0 MILD EPISODE OF RECURRENT MAJOR DEPRESSIVE DISORDER (H): ICD-10-CM

## 2024-06-26 PROCEDURE — 90834 PSYTX W PT 45 MINUTES: CPT | Mod: 95

## 2024-06-26 NOTE — PROGRESS NOTES
[facility]                                     Progress Note    Patient Name: Sudha Arevalo  Date: 6/26/2024         Service Type: Individual      Session Start Time: 3:34pm  Session End Time: 4:24pm     Session Length: 38-52 minutes    Session #: 5    Attendees: Client    Service Modality:  Video Visit:      Provider verified identity through the following two step process.    Patient provided:  Patient is known previously to provider    Telemedicine Visit: The patient's condition can be safely assessed and treated via synchronous audio and visual telemedicine encounter.      Reason for Telemedicine Visit: Services only offered telehealth    Originating Site (Patient Location): Patient's home    Distant Site (Provider Location): Provider Remote Setting- Home Office    Consent:  The patient/guardian has verbally consented to: the potential risks and benefits of telemedicine (video visit) versus in person care; bill my insurance or make self-payment for services provided; and responsibility for payment of non-covered services.     Patient would like the video invitation sent by:  My Chart    Mode of Communication:  Video Conference via Amwell    Distant Location (Provider):  Off-site    As the provider I attest to compliance with applicable laws and regulations related to telemedicine.    DATA  Interactive Complexity: No  Crisis: No        Progress Since Last Session (Related to Symptoms / Goals / Homework):   Symptoms: No change per patient reporting    Homework:  Assigned      Episode of Care Goals: Minimal progress - ACTION (Actively working towards change); Intervened by reinforcing change plan / affirming steps taken     Current / Ongoing Stressors and Concerns:   Patient engaged in reflection on returning to work from FMLA and anxiety arising for working environment she anticipates returning to. Patient identified how ways she would like to return to work with new boundaries and  expectations such as working within the expectations of her job description and established work hours; setting up automatic reply messages when she is outside of work hours, etc.      Treatment Objective(s) Addressed in This Session:   Patient will Identify negative self-talk and behaviors: challenge core beliefs, myths, and actions.  Patient will identify 1-3 fears / thoughts that contribute to feeling anxious.  Patient will identify the time in your life when you began to feel poorly about themselves.       Intervention:  Psychodynamic psychotherapy  Discuss patient's emotional dynamics and issues and how they impact behaviors  Explore patient's history of relationships and how they impact present behaviors  Explore how to work with and make changes in these schemas and patterns  Provided supportive and empathic listening   Utilized interventions of observations/reflections, remarks, and clarifying questions  Supported and guided patient with emotional identification and naming emotion.  Provided validation and normalized emotions arising.  Skills training  Explored specific skills useful to client in current situation  Skill areas include assertiveness, communication, conflict management, problem-solving, relaxation, etc.   Reviewed worksheet on boundary setting and communication     Assessments completed prior to visit:  The following assessments were completed by patient for this visit:  PHQ9:       4/28/2024    11:19 AM 5/7/2024     2:02 AM 6/3/2024     3:04 PM 6/13/2024    10:11 AM   PHQ-9 SCORE   PHQ-9 Total Score MyChart 13 (Moderate depression) 11 (Moderate depression) 5 (Mild depression) 6 (Mild depression)   PHQ-9 Total Score 13 11 5    5 6     GAD7:       4/29/2024     8:54 AM 6/3/2024     3:04 PM   ALIA-7 SCORE   Total Score 8 (mild anxiety) 6 (mild anxiety)   Total Score 8 6    6     PROMIS 10-Global Health (only subscores and total score):       5/7/2024     2:20 AM 6/3/2024     3:11 PM   PROMIS-10  Scores Only   Global Mental Health Score 10 10   Global Physical Health Score 14 17   PROMIS TOTAL - SUBSCORES 24 27     [place] Suicide Severity Rating Scale (Lifetime/Recent)      5/7/2024     1:35 PM   [place] Suicide Severity Rating (Lifetime/Recent)   Q1 Wish to be Dead (Lifetime) N   Q2 Non-Specific Active Suicidal Thoughts (Lifetime) N   Actual Attempt (Lifetime) N   Has subject engaged in non-suicidal self-injurious behavior? (Lifetime) N   Interrupted Attempts (Lifetime) N   Aborted or Self-Interrupted Attempt (Lifetime) N   Preparatory Acts or Behavior (Lifetime) N   Calculated C-SSRS Risk Score (Lifetime/Recent) No Risk Indicated         ASSESSMENT: Current Emotional / Mental Status (status of significant symptoms):   Risk status (Self / Other harm or suicidal ideation)   Patient denies current fears or concerns for personal safety.   Patient denies current or recent suicidal ideation or behaviors.   Patient denies current or recent homicidal ideation or behaviors.   Patient denies current or recent self injurious behavior or ideation.   Patient denies other safety concerns.   Patient reports there has been no change in risk factors since their last session.     Patient reports there has been no change in protective factors since their last session.     Recommended that patient call 911 or go to the local ED should there be a change in any of these risk factors.     Appearance:   Appropriate    Eye Contact:   Good    Psychomotor Behavior: Normal    Attitude:   Pleasant   Orientation:   All   Speech    Rate / Production: Normal/ Responsive    Volume:  Normal    Mood:    Anxious  Depressed    Affect:    Appropriate    Thought Content:  Clear    Thought Form:  Coherent  Logical  Josephine   Insight:    Good      Medication Review:   No current psychiatric medications prescribed     Medication Compliance:   NA     Changes in Health Issues:   None reported     Chemical Use Review:   Substance Use: Chemical  "use reviewed, no active concerns identified      Tobacco Use: No current tobacco use.      Diagnosis:  1. ALIA (generalized anxiety disorder)    2. Mild episode of recurrent major depressive disorder (H24)          Collateral Reports Completed:   Not Applicable    PLAN: (Patient Tasks / Therapist Tasks / Other)  Patient to review boundary setting skills as discussed during session as she approaches return to work. Next session 7/2/24.      Kareen Varmaell MSW, [facility]   6/26/2024                                                             ______________________________________________________________________                                               Individual Treatment Plan     Patient's Name: Sudha Arevalo                       YOB: 1995     Date of Creation: 6/4/2024 (Due 9/2/24)  Date Treatment Plan Last Reviewed/Revised: n/a     DSM5 Diagnoses: 296.31 (F33.0) Major Depressive Disorder, Recurrent Episode, Mild _ or 300.02 (F41.1) Generalized Anxiety Disorder  Psychosocial / Contextual Factors:  work stressors - dealing with increased pressures at work making it hard to say 'no' for taking more tasks/work projects; not receiving proper compensation; lack of support.    PROMIS (reviewed every 90 days):      Referral / Collaboration:  Referral to another professional/service is not indicated at this time..     Anticipated number of session for this episode of care:  24-32  Anticipation frequency of session: Every other week  Anticipated Duration of each session: 38-52 minutes  Treatment plan will be reviewed in 90 days or when goals have been changed.         MeasurableTreatment Goal(s) related to diagnosis / functional impairment(s)  Goal 1: Patient will work to identify thoughts and emotions related to depression and anxiety.    I will know I've met my goal when my symptoms have less impact on my sleep.\"      Objective #A (Patient Action)                          Patient will Identify " negative self-talk and behaviors: challenge core beliefs, myths, and actions.  Status: New - Date: 6/4/24       Intervention(s)  Therapist will teach emotional recognition/identification.      Objective #B  Patient will identify 1-3 fears / thoughts that contribute to feeling anxious.  Status: New - Date: 6/4/24       Intervention(s)  Therapist will teach the client how to perform a behavioral chain analysis.      Objective #C  Patient will identify the time in your life when you began to feel poorly about themselves.  Status: New - Date: 6/4/24       Intervention(s)  Therapist will provide individual therapy to identify triggers to negative self-talk, gain feedback on helpful coping tools and thought-reframing techniques, and identify preferred way of being. Tx to include discuss current stressors and interpersonal conflicts and how to cope with these.        Patient has reviewed and agreed to the above plan.        MALGORZATA Abbasi                  June 4, 2024

## 2024-07-02 ENCOUNTER — VIRTUAL VISIT (OUTPATIENT)
Dept: PSYCHOLOGY | Facility: CLINIC | Age: 29
End: 2024-07-02
Payer: COMMERCIAL

## 2024-07-02 DIAGNOSIS — F41.1 GAD (GENERALIZED ANXIETY DISORDER): Primary | ICD-10-CM

## 2024-07-02 DIAGNOSIS — F33.0 MILD EPISODE OF RECURRENT MAJOR DEPRESSIVE DISORDER (H): ICD-10-CM

## 2024-07-02 PROCEDURE — 90834 PSYTX W PT 45 MINUTES: CPT | Mod: 95

## 2024-07-02 ASSESSMENT — COLUMBIA-SUICIDE SEVERITY RATING SCALE - C-SSRS
TOTAL  NUMBER OF ABORTED OR SELF INTERRUPTED ATTEMPTS SINCE LAST CONTACT: NO
2. HAVE YOU ACTUALLY HAD ANY THOUGHTS OF KILLING YOURSELF?: NO
ATTEMPT SINCE LAST CONTACT: NO
SUICIDE, SINCE LAST CONTACT: NO
1. SINCE LAST CONTACT, HAVE YOU WISHED YOU WERE DEAD OR WISHED YOU COULD GO TO SLEEP AND NOT WAKE UP?: NO
TOTAL  NUMBER OF INTERRUPTED ATTEMPTS SINCE LAST CONTACT: NO
6. HAVE YOU EVER DONE ANYTHING, STARTED TO DO ANYTHING, OR PREPARED TO DO ANYTHING TO END YOUR LIFE?: NO

## 2024-07-02 ASSESSMENT — ANXIETY QUESTIONNAIRES
3. WORRYING TOO MUCH ABOUT DIFFERENT THINGS: SEVERAL DAYS
2. NOT BEING ABLE TO STOP OR CONTROL WORRYING: SEVERAL DAYS
GAD7 TOTAL SCORE: 5
7. FEELING AFRAID AS IF SOMETHING AWFUL MIGHT HAPPEN: NOT AT ALL
IF YOU CHECKED OFF ANY PROBLEMS ON THIS QUESTIONNAIRE, HOW DIFFICULT HAVE THESE PROBLEMS MADE IT FOR YOU TO DO YOUR WORK, TAKE CARE OF THINGS AT HOME, OR GET ALONG WITH OTHER PEOPLE: SOMEWHAT DIFFICULT
8. IF YOU CHECKED OFF ANY PROBLEMS, HOW DIFFICULT HAVE THESE MADE IT FOR YOU TO DO YOUR WORK, TAKE CARE OF THINGS AT HOME, OR GET ALONG WITH OTHER PEOPLE?: SOMEWHAT DIFFICULT
1. FEELING NERVOUS, ANXIOUS, OR ON EDGE: SEVERAL DAYS
6. BECOMING EASILY ANNOYED OR IRRITABLE: SEVERAL DAYS
GAD7 TOTAL SCORE: 5
5. BEING SO RESTLESS THAT IT IS HARD TO SIT STILL: NOT AT ALL
4. TROUBLE RELAXING: SEVERAL DAYS
7. FEELING AFRAID AS IF SOMETHING AWFUL MIGHT HAPPEN: NOT AT ALL
GAD7 TOTAL SCORE: 5

## 2024-07-02 ASSESSMENT — PATIENT HEALTH QUESTIONNAIRE - PHQ9
10. IF YOU CHECKED OFF ANY PROBLEMS, HOW DIFFICULT HAVE THESE PROBLEMS MADE IT FOR YOU TO DO YOUR WORK, TAKE CARE OF THINGS AT HOME, OR GET ALONG WITH OTHER PEOPLE: SOMEWHAT DIFFICULT
SUM OF ALL RESPONSES TO PHQ QUESTIONS 1-9: 5
SUM OF ALL RESPONSES TO PHQ QUESTIONS 1-9: 5

## 2024-07-02 NOTE — PROGRESS NOTES
[facility]                                     Progress Note    Patient Name: Sudha Arevalo  Date: 7/2/2024         Service Type: Individual      Session Start Time: 1:32pm  Session End Time: 2:25pm     Session Length: 38-52 minutes    Session #: 6    Attendees: Client    Service Modality:  Video Visit:      Provider verified identity through the following two step process.    Patient provided:  Patient is known previously to provider    Telemedicine Visit: The patient's condition can be safely assessed and treated via synchronous audio and visual telemedicine encounter.      Reason for Telemedicine Visit: Services only offered telehealth    Originating Site (Patient Location): Patient's home    Distant Site (Provider Location): Provider Remote Setting- Home Office    Consent:  The patient/guardian has verbally consented to: the potential risks and benefits of telemedicine (video visit) versus in person care; bill my insurance or make self-payment for services provided; and responsibility for payment of non-covered services.     Patient would like the video invitation sent by:  My Chart    Mode of Communication:  Video Conference via Amwell    Distant Location (Provider):  Off-site    As the provider I attest to compliance with applicable laws and regulations related to telemedicine.    DATA  Interactive Complexity: No  Crisis: No        Progress Since Last Session (Related to Symptoms / Goals / Homework):   Symptoms: No change per patient reporting    Homework:  Assigned      Episode of Care Goals: Minimal progress - ACTION (Actively working towards change); Intervened by reinforcing change plan / affirming steps taken     Current / Ongoing Stressors and Concerns:   Patient reflected on return to work this week and identifying supports needed as she acclimates back into her routine. Patient identified feeling under valued in her current workplace and beginning to work toward finding  employment that aligns with her values.      Treatment Objective(s) Addressed in This Session:   Patient will Identify negative self-talk and behaviors: challenge core beliefs, myths, and actions.  Patient will identify 1-3 fears / thoughts that contribute to feeling anxious.  Patient will identify the time in your life when you began to feel poorly about themselves.       Intervention:  Interpersonal Psychotherapy  Explored patterns in relationships that are effective or ineffective at helping patient reach their goals, find satisfying experience.  Discussed new patterns or behaviors to engage in for improved social functioning.  Psychodynamic psychotherapy  Discuss patient's emotional dynamics and issues and how they impact behaviors  Explore patient's history of relationships and how they impact present behaviors  Explore how to work with and make changes in these schemas and patterns  Provided supportive and empathic listening   Utilized interventions of observations/reflections, remarks, and clarifying questions  Supported and guided patient with emotional identification and naming emotion.  Provided validation and normalized emotions arising.      Assessments completed prior to visit:  The following assessments were completed by patient for this visit:  PHQ9:       4/28/2024    11:19 AM 5/7/2024     2:02 AM 6/3/2024     3:04 PM 6/13/2024    10:11 AM 7/2/2024     1:30 PM   PHQ-9 SCORE   PHQ-9 Total Score MyChart 13 (Moderate depression) 11 (Moderate depression) 5 (Mild depression) 6 (Mild depression) 5 (Mild depression)   PHQ-9 Total Score 13 11 5    5 6 5     GAD7:       4/29/2024     8:54 AM 6/3/2024     3:04 PM 7/2/2024     1:31 PM   ALIA-7 SCORE   Total Score 8 (mild anxiety) 6 (mild anxiety) 5 (mild anxiety)   Total Score 8 6    6 5     PROMIS 10-Global Health (only subscores and total score):       5/7/2024     2:20 AM 6/3/2024     3:11 PM 7/2/2024     1:32 PM   PROMIS-10 Scores Only   Global Mental Health  Score 10 10 11   Global Physical Health Score 14 17 17   PROMIS TOTAL - SUBSCORES 24 27 28     [place] Suicide Severity Rating Scale (Short Version)      7/2/2024     1:35 PM   [place] Suicide Severity Rating (Short Version)   1. Wish to be Dead (Since Last Contact) N   2. Non-Specific Active Suicidal Thoughts (Since Last Contact) N   Actual Attempt (Since Last Contact) N   Has subject engaged in non-suicidal self-injurious behavior? (Since Last Contact) N   Interrupted Attempts (Since Last Contact) N   Aborted or Self-Interrupted Attempt (Since Last Contact) N   Preparatory Acts or Behavior (Since Last Contact) N   Suicide (Since Last Contact) N   Calculated C-SSRS Risk Score (Since Last Contact) No Risk Indicated          ASSESSMENT: Current Emotional / Mental Status (status of significant symptoms):   Risk status (Self / Other harm or suicidal ideation)   Patient denies current fears or concerns for personal safety.   Patient denies current or recent suicidal ideation or behaviors.   Patient denies current or recent homicidal ideation or behaviors.   Patient denies current or recent self injurious behavior or ideation.   Patient denies other safety concerns.   Patient reports there has been no change in risk factors since their last session.     Patient reports there has been no change in protective factors since their last session.     Recommended that patient call 911 or go to the local ED should there be a change in any of these risk factors.     Appearance:   Appropriate    Eye Contact:   Good    Psychomotor Behavior: Normal    Attitude:   Pleasant   Orientation:   All   Speech    Rate / Production: Normal/ Responsive    Volume:  Normal    Mood:    Anxious    Affect:    Appropriate    Thought Content:  Clear    Thought Form:  Coherent  Logical    Insight:    Good      Medication Review:   No current psychiatric medications prescribed     Medication Compliance:   NA     Changes in Health Issues:   None  "reported     Chemical Use Review:   Substance Use: Chemical use reviewed, no active concerns identified      Tobacco Use: No current tobacco use.      Diagnosis:  1. ALIA (generalized anxiety disorder)    2. Mild episode of recurrent major depressive disorder (H24)            Collateral Reports Completed:   Not Applicable    PLAN: (Patient Tasks / Therapist Tasks / Other)  Patient to continue to work on identifying current needs as she acclimates to return to work. Next session 7/25/24.    Kareen Sandra MSW, LICSW   7/2/2024                                                         ______________________________________________________________________                                               Individual Treatment Plan     Patient's Name: Sudha Arevalo                       YOB: 1995     Date of Creation: 6/4/2024   Date Treatment Plan Last Reviewed/Revised: 7/2/2024 (Due 9/30/24)     DSM5 Diagnoses: 296.31 (F33.0) Major Depressive Disorder, Recurrent Episode, Mild _ or 300.02 (F41.1) Generalized Anxiety Disorder  Psychosocial / Contextual Factors:  work stressors - dealing with increased pressures at work making it hard to say 'no' for taking more tasks/work projects; not receiving proper compensation; lack of support.    PROMIS (reviewed every 90 days):      Referral / Collaboration:  Referral to another professional/service is not indicated at this time..     Anticipated number of session for this episode of care:  24-32  Anticipation frequency of session: Every 4 weeks  Anticipated Duration of each session: 38-52 minutes  Treatment plan will be reviewed in 90 days or when goals have been changed.         MeasurableTreatment Goal(s) related to diagnosis / functional impairment(s)  Goal 1: Patient will work to identify thoughts and emotions related to depression and anxiety.    I will know I've met my goal when my symptoms have less impact on my sleep.\"      Objective #A (Patient " Action)                          Patient will Identify negative self-talk and behaviors: challenge core beliefs, myths, and actions.  Status: Continued - Date: 7/2/2024       Intervention(s)  Therapist will teach emotional recognition/identification.      Objective #B  Patient will identify 1-3 fears / thoughts that contribute to feeling anxious.  Status: Continued - Date: 7/2/2024        Intervention(s)  Therapist will teach the client how to perform a behavioral chain analysis.      Objective #C  Patient will identify the time in your life when you began to feel poorly about themselves.  Status: Continued - Date: 7/2/2024        Intervention(s)  Therapist will provide individual therapy to identify triggers to negative self-talk, gain feedback on helpful coping tools and thought-reframing techniques, and identify preferred way of being. Tx to include discuss current stressors and interpersonal conflicts and how to cope with these.        Patient has reviewed and agreed to the above plan.        MALGORZATA Abbasi                  June 4, 2024; 7/2/2024

## 2024-07-25 ENCOUNTER — VIRTUAL VISIT (OUTPATIENT)
Dept: PSYCHOLOGY | Facility: CLINIC | Age: 29
End: 2024-07-25
Payer: COMMERCIAL

## 2024-07-25 DIAGNOSIS — F41.1 GAD (GENERALIZED ANXIETY DISORDER): Primary | ICD-10-CM

## 2024-07-25 DIAGNOSIS — F33.0 MILD EPISODE OF RECURRENT MAJOR DEPRESSIVE DISORDER (H): ICD-10-CM

## 2024-07-25 PROCEDURE — 90834 PSYTX W PT 45 MINUTES: CPT | Mod: 95

## 2024-07-25 ASSESSMENT — PATIENT HEALTH QUESTIONNAIRE - PHQ9
SUM OF ALL RESPONSES TO PHQ QUESTIONS 1-9: 1

## 2024-07-25 NOTE — PROGRESS NOTES
[facility]                                     Progress Note    Patient Name: Sudha Arevalo  Date: 7/25/2024       Service Type: Individual      Session Start Time: 12:33pm  Session End Time: 1:17pm     Session Length: 38-52 minutes    Session #: 7    Attendees: Client    Service Modality:  Video Visit:      Provider verified identity through the following two step process.    Patient provided:  Patient is known previously to provider    Telemedicine Visit: The patient's condition can be safely assessed and treated via synchronous audio and visual telemedicine encounter.      Reason for Telemedicine Visit: Services only offered telehealth    Originating Site (Patient Location): Patient's home    Distant Site (Provider Location): Provider Remote Setting- Home Office    Consent:  The patient/guardian has verbally consented to: the potential risks and benefits of telemedicine (video visit) versus in person care; bill my insurance or make self-payment for services provided; and responsibility for payment of non-covered services.     Patient would like the video invitation sent by:  My Chart    Mode of Communication:  Video Conference via Amwell    Distant Location (Provider):  Off-site    As the provider I attest to compliance with applicable laws and regulations related to telemedicine.    DATA  Interactive Complexity: No  Crisis: No        Progress Since Last Session (Related to Symptoms / Goals / Homework):   Symptoms: Improving per patient reporting    Homework:  Assigned      Episode of Care Goals: Minimal progress - ACTION (Actively working towards change); Intervened by reinforcing change plan / affirming steps taken     Current / Ongoing Stressors and Concerns:   Patient reported feeling triggered by work-related stress, particularly due to feelings of being micro managed and experiencing controlling behavior from supervisors. Patient described a recent interaction with the  of  their division regarding vacation time, which they found intrusive and controlling. Patient shared frustrations about a new team member's over-enthusiasm and the pressure to engage in forced social activities during work retreats. Patient expressed dissatisfaction with the workplace culture, noting it felt suffocating and exclusionary. Patient discussed their recent performance review, where they candidly addressed issues such as high turnover, lack of support, and perceived racial biases in hiring practices. Patient admitted feeling relieved after being radically honest during the review but also noted ongoing concerns about workplace stability.       Treatment Objective(s) Addressed in This Session:   Patient will Identify negative self-talk and behaviors: challenge core beliefs, myths, and actions.  Patient will identify 1-3 fears / thoughts that contribute to feeling anxious.  Patient will identify the time in your life when you began to feel poorly about themselves.       Intervention:  Therapist used supportive reflection to validate patients feelings about workplace stressors and control issues. Therapist encouraged patient to explore emotions related to these experiences and provided space for them to express their frustrations openly. Therapist assisted patient in identifying maladaptive beliefs about the need to conform to toxic workplace norms and helped reframe thoughts towards self-advocacy and setting boundaries. Therapist highlighted the importance of radical authenticity in professional settings as a means of self-preservation and empowerment. Therapist offered psychoeducation on how systemic issues within workplaces can impact individual well-being and validated patients decision to share and document their concerns formally with their supervisor.       Assessments completed prior to visit:  The following assessments were completed by patient for this visit:  PHQ9:       4/28/2024    11:19 AM  5/7/2024     2:02 AM 6/3/2024     3:04 PM 6/13/2024    10:11 AM 7/2/2024     1:30 PM 7/25/2024    12:28 PM   PHQ-9 SCORE   PHQ-9 Total Score MyChart 13 (Moderate depression) 11 (Moderate depression) 5 (Mild depression) 6 (Mild depression) 5 (Mild depression) 1 (Minimal depression)   PHQ-9 Total Score 13 11 5    5 6 5 1    1     GAD7:       4/29/2024     8:54 AM 6/3/2024     3:04 PM 7/2/2024     1:31 PM   ALIA-7 SCORE   Total Score 8 (mild anxiety) 6 (mild anxiety) 5 (mild anxiety)   Total Score 8 6    6 5     PROMIS 10-Global Health (only subscores and total score):       5/7/2024     2:20 AM 6/3/2024     3:11 PM 7/2/2024     1:32 PM   PROMIS-10 Scores Only   Global Mental Health Score 10 10 11   Global Physical Health Score 14 17 17   PROMIS TOTAL - SUBSCORES 24 27 28     [place] Suicide Severity Rating Scale (Short Version)      7/2/2024     1:35 PM   [place] Suicide Severity Rating (Short Version)   1. Wish to be Dead (Since Last Contact) N   2. Non-Specific Active Suicidal Thoughts (Since Last Contact) N   Actual Attempt (Since Last Contact) N   Has subject engaged in non-suicidal self-injurious behavior? (Since Last Contact) N   Interrupted Attempts (Since Last Contact) N   Aborted or Self-Interrupted Attempt (Since Last Contact) N   Preparatory Acts or Behavior (Since Last Contact) N   Suicide (Since Last Contact) N   Calculated C-SSRS Risk Score (Since Last Contact) No Risk Indicated          ASSESSMENT: Current Emotional / Mental Status (status of significant symptoms):   Risk status (Self / Other harm or suicidal ideation)   Patient denies current fears or concerns for personal safety.   Patient denies current or recent suicidal ideation or behaviors.   Patient denies current or recent homicidal ideation or behaviors.   Patient denies current or recent self injurious behavior or ideation.   Patient denies other safety concerns.   Patient reports there has been no change in risk factors since their last  session.     Patient reports there has been no change in protective factors since their last session.     Recommended that patient call 911 or go to the local ED should there be a change in any of these risk factors.     Appearance:   Appropriate    Eye Contact:   Good    Psychomotor Behavior: Normal    Attitude:   Pleasant   Orientation:   All   Speech    Rate / Production: Normal/ Responsive    Volume:  Normal    Mood:    Anxious    Affect:    Appropriate    Thought Content:  Clear    Thought Form:  Coherent  Logical    Insight:    Good      Medication Review:   No current psychiatric medications prescribed     Medication Compliance:   NA     Changes in Health Issues:   None reported     Chemical Use Review:   Substance Use: Chemical use reviewed, no active concerns identified      Tobacco Use: No current tobacco use.      Diagnosis:  1. ALIA (generalized anxiety disorder)    2. Mild episode of recurrent major depressive disorder (H24)        Collateral Reports Completed:   Not Applicable    PLAN: (Patient Tasks / Therapist Tasks / Other)  Patient to continue working on skill of setting work and personal boundaries. Next session 8/15/24.    Kareen Flores MSW, [facility]   7/25/2024      ______________________________________________________________________                                               Individual Treatment Plan     Patient's Name: Sudha Arevalo                       YOB: 1995     Date of Creation: 6/4/2024   Date Treatment Plan Last Reviewed/Revised: 7/2/2024 (Due 9/30/24)     DSM5 Diagnoses: 296.31 (F33.0) Major Depressive Disorder, Recurrent Episode, Mild _ or 300.02 (F41.1) Generalized Anxiety Disorder  Psychosocial / Contextual Factors:  work stressors - dealing with increased pressures at work making it hard to say 'no' for taking more tasks/work projects; not receiving proper compensation; lack of support.    PROMIS (reviewed every 90 days):      Referral /  "Collaboration:  Referral to another professional/service is not indicated at this time..     Anticipated number of session for this episode of care:  24-32  Anticipation frequency of session: Every 4 weeks  Anticipated Duration of each session: 38-52 minutes  Treatment plan will be reviewed in 90 days or when goals have been changed.         MeasurableTreatment Goal(s) related to diagnosis / functional impairment(s)  Goal 1: Patient will work to identify thoughts and emotions related to depression and anxiety.    I will know I've met my goal when my symptoms have less impact on my sleep.\"      Objective #A (Patient Action)                          Patient will Identify negative self-talk and behaviors: challenge core beliefs, myths, and actions.  Status: Continued - Date: 7/2/2024       Intervention(s)  Therapist will teach emotional recognition/identification.      Objective #B  Patient will identify 1-3 fears / thoughts that contribute to feeling anxious.  Status: Continued - Date: 7/2/2024        Intervention(s)  Therapist will teach the client how to perform a behavioral chain analysis.      Objective #C  Patient will identify the time in your life when you began to feel poorly about themselves.  Status: Continued - Date: 7/2/2024        Intervention(s)  Therapist will provide individual therapy to identify triggers to negative self-talk, gain feedback on helpful coping tools and thought-reframing techniques, and identify preferred way of being. Tx to include discuss current stressors and interpersonal conflicts and how to cope with these.        Patient has reviewed and agreed to the above plan.        MALGORZATA Abbasi                  June 4, 2024; 7/2/2024        "

## 2024-07-28 ASSESSMENT — ANXIETY QUESTIONNAIRES
5. BEING SO RESTLESS THAT IT IS HARD TO SIT STILL: NOT AT ALL
GAD7 TOTAL SCORE: 1
4. TROUBLE RELAXING: NOT AT ALL
1. FEELING NERVOUS, ANXIOUS, OR ON EDGE: NOT AT ALL
7. FEELING AFRAID AS IF SOMETHING AWFUL MIGHT HAPPEN: NOT AT ALL
6. BECOMING EASILY ANNOYED OR IRRITABLE: NOT AT ALL
8. IF YOU CHECKED OFF ANY PROBLEMS, HOW DIFFICULT HAVE THESE MADE IT FOR YOU TO DO YOUR WORK, TAKE CARE OF THINGS AT HOME, OR GET ALONG WITH OTHER PEOPLE?: NOT DIFFICULT AT ALL
IF YOU CHECKED OFF ANY PROBLEMS ON THIS QUESTIONNAIRE, HOW DIFFICULT HAVE THESE PROBLEMS MADE IT FOR YOU TO DO YOUR WORK, TAKE CARE OF THINGS AT HOME, OR GET ALONG WITH OTHER PEOPLE: NOT DIFFICULT AT ALL
3. WORRYING TOO MUCH ABOUT DIFFERENT THINGS: NOT AT ALL
7. FEELING AFRAID AS IF SOMETHING AWFUL MIGHT HAPPEN: NOT AT ALL
2. NOT BEING ABLE TO STOP OR CONTROL WORRYING: SEVERAL DAYS
GAD7 TOTAL SCORE: 1

## 2024-07-29 ENCOUNTER — OFFICE VISIT (OUTPATIENT)
Dept: FAMILY MEDICINE | Facility: CLINIC | Age: 29
End: 2024-07-29
Payer: COMMERCIAL

## 2024-07-29 VITALS
TEMPERATURE: 98.4 F | OXYGEN SATURATION: 98 % | RESPIRATION RATE: 16 BRPM | SYSTOLIC BLOOD PRESSURE: 108 MMHG | HEART RATE: 80 BPM | DIASTOLIC BLOOD PRESSURE: 72 MMHG | BODY MASS INDEX: 36.65 KG/M2 | HEIGHT: 70 IN

## 2024-07-29 DIAGNOSIS — F32.0 CURRENT MILD EPISODE OF MAJOR DEPRESSIVE DISORDER WITHOUT PRIOR EPISODE (H): ICD-10-CM

## 2024-07-29 DIAGNOSIS — F41.1 GAD (GENERALIZED ANXIETY DISORDER): ICD-10-CM

## 2024-07-29 DIAGNOSIS — Z76.89 ENCOUNTER TO ESTABLISH CARE: Primary | ICD-10-CM

## 2024-07-29 DIAGNOSIS — L70.0 ACNE VULGARIS: ICD-10-CM

## 2024-07-29 PROCEDURE — 99213 OFFICE O/P EST LOW 20 MIN: CPT

## 2024-07-29 RX ORDER — SPIRONOLACTONE 50 MG/1
50 TABLET, FILM COATED ORAL DAILY
Qty: 90 TABLET | Refills: 3 | Status: SHIPPED | OUTPATIENT
Start: 2024-07-29

## 2024-07-29 ASSESSMENT — PAIN SCALES - GENERAL: PAINLEVEL: NO PAIN (0)

## 2024-07-29 NOTE — PROGRESS NOTES
Assessment & Plan     Encounter to establish care  Would like to establish care.  Medical, surgical, social history reviewed.  Will return back for annual physical exam.      Acne vulgaris  Managed with spironolactone 50 mg daily and topical metronidazole cream..  Blood pressure in clinic 108/72.  Denies any symptoms of lightheadedness, dizziness, syncopal episodes, headache, vision changes.  - spironolactone (ALDACTONE) 50 MG tablet; Take 1 tablet (50 mg) by mouth daily    ALIA (generalized anxiety disorder)  Current mild episode of major depressive disorder without prior episode (H24)  ALIA score 1 and PHQ score 1.  Reports mental health as good.  Returned back to work full-time 7/1/2024.  Continues to see therapist through virtual visits.     Von Haley is a 28 year old, presenting for the following health issues:  Follow Up (Mental health) and Establish Care      7/29/2024    11:09 AM   Additional Questions   Roomed by Roxy DANIELLE CMA     History of Present Illness       Mental Health Follow-up:  Patient presents to follow-up on Depression & Anxiety.Patient's depression since last visit has been:  Good  The patient is not having other symptoms associated with depression.  Patient's anxiety since last visit has been:  Better  The patient is not having other symptoms associated with anxiety.  Any significant life events: No  Patient is not feeling anxious or having panic attacks.  Patient has no concerns about alcohol or drug use.     Patient is a 28-year-old female presenting for mental health follow up and establish care.  Medical history includes anxiety, depression, and acne.  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  [facility] paperwork completed for dates 5/13/2024 to 6/13/2024 at prior visit.  She has been attending weekly therapy sessions with Kareen Flores.  Therapy sessions has been going very well.  No medication management at this time for anxiety and depression.   Kareen will be transitioning to a new job so she trying to find a new therapist for Sudha.  Patient returned back to work full-time 7/1/2024.  Patient reports return back to work has been going well.  Currently it is slower season as it is summer.  Has received support from manager and feels good about relationship.  Will continue to attend therapy sessions.        6/3/2024     3:04 PM 7/2/2024     1:31 PM 7/28/2024     1:54 PM   ALIA-7 SCORE   Total Score 6 (mild anxiety) 5 (mild anxiety) 1 (minimal anxiety)   Total Score 6    6 5 1         6/13/2024    10:11 AM 7/2/2024     1:30 PM 7/25/2024    12:28 PM   PHQ   PHQ-9 Total Score 6 5 1    1   Q9: Thoughts of better off dead/self-harm past 2 weeks Not at all Not at all Not at all     Review of Systems  Review of systems negative otherwise known HPI.  Past Medical History:   Diagnosis Date    Amblyopia     Wore eye patch as a child    Strabismus      History reviewed. No pertinent surgical history.    Family History   Problem Relation Age of Onset    Breast Cancer Maternal Grandmother     Crohn's Disease Maternal Grandmother     Hyperkalemia Maternal Grandmother     Coronary Artery Disease Maternal Grandfather     Hypotension Paternal Grandmother     Amblyopia No family hx of     Glaucoma No family hx of     Macular Degeneration No family hx of      Social History     Tobacco Use    Smoking status: Never    Smokeless tobacco: Never   Substance Use Topics    Alcohol use: Yes     Comment: occassional one drink every 3 weeks     Current Outpatient Medications   Medication Sig Dispense Refill    metroNIDAZOLE (METROCREAM) 0.75 % external cream       spironolactone (ALDACTONE) 50 MG tablet Take 1 tablet (50 mg) by mouth daily 90 tablet 3     No current facility-administered medications for this visit.       Objective    /72 (BP Location: Left arm, Patient Position: Sitting, Cuff Size: Adult Regular)   Pulse 80   Temp 98.4  F (36.9  C) (Temporal)   Resp 16    "Ht 1.778 m (5' 10\")   LMP 07/05/2024 (Approximate)   SpO2 98%   BMI 36.65 kg/m    Body mass index is 36.65 kg/m .  Physical Exam   General: Alert, oriented, no acute distress.    Respiratory: Easy work of breathing.   Cardiovascular: Appears warm and well-perfused.    Skin: No abnormalities noted on visualized skin.   Neurologic: Mentation intact and speech normal.     Psychiatric: Appropriate affect.     Signed Electronically by: DUANE Guerrier CNP    "

## 2024-08-15 ENCOUNTER — VIRTUAL VISIT (OUTPATIENT)
Dept: PSYCHOLOGY | Facility: CLINIC | Age: 29
End: 2024-08-15
Payer: COMMERCIAL

## 2024-08-15 DIAGNOSIS — F41.1 GAD (GENERALIZED ANXIETY DISORDER): Primary | ICD-10-CM

## 2024-08-15 DIAGNOSIS — F33.0 MILD EPISODE OF RECURRENT MAJOR DEPRESSIVE DISORDER (H): ICD-10-CM

## 2024-08-15 PROCEDURE — 90834 PSYTX W PT 45 MINUTES: CPT | Mod: 95

## 2024-08-15 NOTE — PROGRESS NOTES
Discharge Summary  Multiple Sessions    Client Name: Sudha Arevalo MRN#: [id number] YOB: 1995    Discharge Date:   August 15, 2024    Service Modality: Video Visit:      Provider verified identity through the following two step process.    Patient provided:  Patient is known previously to provider    Telemedicine Visit: The patient's condition can be safely assessed and treated via synchronous audio and visual telemedicine encounter.      Reason for Telemedicine Visit: Services only offered telehealth    Originating Site (Patient Location): Patient's home    Distant Site (Provider Location): Provider Remote Setting- Home Office    Consent:  The patient/guardian has verbally consented to: the potential risks and benefits of telemedicine (video visit) versus in person care; bill my insurance or make self-payment for services provided; and responsibility for payment of non-covered services.     Patient would like the video invitation sent by:  My Chart    Mode of Communication:  Video Conference via Amwell    Distant Location (Provider):  Off-site    As the provider I attest to compliance with applicable laws and regulations related to telemedicine.    Service Type: Individual      Session Start Time: 10:37am  Session End Time: 11:25am      Session Length: 38-52 minutes     Session #: 8     Attendees: Client      Focus of Treatment Objective(s):  Client's presenting concerns included:   Patient presented with stressors of anxiety and depression with functional impairments of physical symptoms such as nausea and muscle tension, feelings of low self-worth and self-esteem and rumination. Patient has struggled with symptoms off and on since her childhood. Patient also identified workplace stressors impacting symptoms.   Stage of Change at time of Discharge: ACTION (Actively working towards change)    Medication Adherence:  NA    Chemical Use:  NA    Assessment: Current Emotional / Mental Status (status  of significant symptoms):    Risk status (Self / Other harm or suicidal ideation)  Client denies current fears or concerns for personal safety.  Client denies current or recent suicidal ideation or behaviors.  Client denies current or recent homicidal ideation or behaviors.  Client denies current or recent self injurious behavior or ideation.  Client denies other safety concerns.  A safety and risk management plan has not been developed at this time, however client was given the after-hours number should there be a change in any of these risk factors.    Appearance:   Appropriate   Eye Contact:   Good   Psychomotor Behavior: Normal   Attitude:   Friendly Pleasant  Orientation:   All  Speech   Rate / Production: Normal    Volume:  Normal   Mood:    Anxious  Depressed  Normal  Affect:    Appropriate   Thought Content:  Clear   Thought Form:  Coherent  Logical   Insight:   Good     DSM5 Diagnoses: (Sustained by DSM5 Criteria Listed Above)  Diagnoses: 296.31 (F33.0) Major Depressive Disorder, Recurrent Episode, Mild _  300.02 (F41.1) Generalized Anxiety Disorder  Psychosocial & Contextual Factors: work stressors - dealing with increased pressures at work making it hard to say 'no' for taking more tasks/work projects; not receiving proper compensation; lack of support.   Assessments Completed:  The following assessments were completed by patient for this visit:  PHQ9:       4/28/2024    11:19 AM 5/7/2024     2:02 AM 6/3/2024     3:04 PM 6/13/2024    10:11 AM 7/2/2024     1:30 PM 7/25/2024    12:28 PM   PHQ-9 SCORE   PHQ-9 Total Score MyChart 13 (Moderate depression) 11 (Moderate depression) 5 (Mild depression) 6 (Mild depression) 5 (Mild depression) 1 (Minimal depression)   PHQ-9 Total Score 13 11 5    5 6 5 1    1     GAD7:       4/29/2024     8:54 AM 6/3/2024     3:04 PM 7/2/2024     1:31 PM 7/28/2024     1:54 PM   ALIA-7 SCORE   Total Score 8 (mild anxiety) 6 (mild anxiety) 5 (mild anxiety) 1 (minimal anxiety)   Total  Score 8 6    6 5 1     PROMIS 10-Global Health (only subscores and total score):       5/7/2024     2:20 AM 6/3/2024     3:11 PM 7/2/2024     1:32 PM   PROMIS-10 Scores Only   Global Mental Health Score 10 10 11   Global Physical Health Score 14 17 17   PROMIS TOTAL - SUBSCORES 24 27 28     [place] Suicide Severity Rating Scale (Short Version)      7/2/2024     1:35 PM   [place] Suicide Severity Rating (Short Version)   1. Wish to be Dead (Since Last Contact) N   2. Non-Specific Active Suicidal Thoughts (Since Last Contact) N   Actual Attempt (Since Last Contact) N   Has subject engaged in non-suicidal self-injurious behavior? (Since Last Contact) N   Interrupted Attempts (Since Last Contact) N   Aborted or Self-Interrupted Attempt (Since Last Contact) N   Preparatory Acts or Behavior (Since Last Contact) N   Suicide (Since Last Contact) N   Calculated C-SSRS Risk Score (Since Last Contact) No Risk Indicated       Reason for Discharge:  Therapist exiting [facility].      Aftercare Plan:  Client will transfer to [facility]. Referral made by current therapist.      MALGORZATA Abbasi  August 15, 2024

## 2024-11-30 ENCOUNTER — HEALTH MAINTENANCE LETTER (OUTPATIENT)
Age: 29
End: 2024-11-30

## 2025-07-09 ENCOUNTER — TRANSFERRED RECORDS (OUTPATIENT)
Dept: HEALTH INFORMATION MANAGEMENT | Facility: CLINIC | Age: 30
End: 2025-07-09
Payer: COMMERCIAL

## 2025-07-23 ENCOUNTER — APPOINTMENT (OUTPATIENT)
Dept: CT IMAGING | Facility: CLINIC | Age: 30
End: 2025-07-23
Attending: STUDENT IN AN ORGANIZED HEALTH CARE EDUCATION/TRAINING PROGRAM
Payer: COMMERCIAL

## 2025-07-23 ENCOUNTER — OFFICE VISIT (OUTPATIENT)
Dept: URGENT CARE | Facility: URGENT CARE | Age: 30
End: 2025-07-23
Payer: COMMERCIAL

## 2025-07-23 ENCOUNTER — ANESTHESIA EVENT (OUTPATIENT)
Dept: SURGERY | Facility: CLINIC | Age: 30
End: 2025-07-23
Payer: COMMERCIAL

## 2025-07-23 ENCOUNTER — ANESTHESIA (OUTPATIENT)
Dept: SURGERY | Facility: CLINIC | Age: 30
End: 2025-07-23
Payer: COMMERCIAL

## 2025-07-23 ENCOUNTER — HOSPITAL ENCOUNTER (EMERGENCY)
Facility: CLINIC | Age: 30
Discharge: HOME | End: 2025-07-23
Attending: STUDENT IN AN ORGANIZED HEALTH CARE EDUCATION/TRAINING PROGRAM
Payer: COMMERCIAL

## 2025-07-23 VITALS
SYSTOLIC BLOOD PRESSURE: 114 MMHG | RESPIRATION RATE: 16 BRPM | WEIGHT: 230 LBS | OXYGEN SATURATION: 96 % | BODY MASS INDEX: 32.93 KG/M2 | DIASTOLIC BLOOD PRESSURE: 61 MMHG | HEIGHT: 70 IN | HEART RATE: 74 BPM | TEMPERATURE: 97.3 F

## 2025-07-23 VITALS
RESPIRATION RATE: 16 BRPM | BODY MASS INDEX: 36.65 KG/M2 | SYSTOLIC BLOOD PRESSURE: 101 MMHG | TEMPERATURE: 96.9 F | HEIGHT: 70 IN | DIASTOLIC BLOOD PRESSURE: 75 MMHG | OXYGEN SATURATION: 100 % | HEART RATE: 86 BPM

## 2025-07-23 DIAGNOSIS — K35.30 ACUTE APPENDICITIS WITH LOCALIZED PERITONITIS, WITHOUT PERFORATION, ABSCESS, OR GANGRENE: Primary | ICD-10-CM

## 2025-07-23 DIAGNOSIS — G89.18 ACUTE POST-OPERATIVE PAIN: ICD-10-CM

## 2025-07-23 DIAGNOSIS — R11.2 NAUSEA AND VOMITING, UNSPECIFIED VOMITING TYPE: ICD-10-CM

## 2025-07-23 DIAGNOSIS — R10.33 PERIUMBILICAL ABDOMINAL PAIN: ICD-10-CM

## 2025-07-23 DIAGNOSIS — R10.31 RLQ ABDOMINAL PAIN: Primary | ICD-10-CM

## 2025-07-23 LAB
ALBUMIN SERPL BCG-MCNC: 4.4 G/DL (ref 3.5–5.2)
ALBUMIN UR-MCNC: 20 MG/DL
ALP SERPL-CCNC: 90 U/L (ref 40–150)
ALT SERPL W P-5'-P-CCNC: 22 U/L (ref 0–50)
ANION GAP SERPL CALCULATED.3IONS-SCNC: 13 MMOL/L (ref 7–15)
APPEARANCE UR: CLEAR
AST SERPL W P-5'-P-CCNC: 33 U/L (ref 0–45)
BASOPHILS # BLD AUTO: 0 10E3/UL (ref 0–0.2)
BASOPHILS NFR BLD AUTO: 0 %
BILIRUB SERPL-MCNC: 0.3 MG/DL
BILIRUB UR QL STRIP: NEGATIVE
BUN SERPL-MCNC: 9.1 MG/DL (ref 6–20)
CALCIUM SERPL-MCNC: 9.3 MG/DL (ref 8.8–10.4)
CHLORIDE SERPL-SCNC: 103 MMOL/L (ref 98–107)
COLOR UR AUTO: YELLOW
CREAT SERPL-MCNC: 0.81 MG/DL (ref 0.51–0.95)
EGFRCR SERPLBLD CKD-EPI 2021: >90 ML/MIN/1.73M2
EOSINOPHIL # BLD AUTO: 0 10E3/UL (ref 0–0.7)
EOSINOPHIL NFR BLD AUTO: 0 %
ERYTHROCYTE [DISTWIDTH] IN BLOOD BY AUTOMATED COUNT: 13.2 % (ref 10–15)
GLUCOSE SERPL-MCNC: 111 MG/DL (ref 70–99)
GLUCOSE UR STRIP-MCNC: NEGATIVE MG/DL
HCG UR QL: NEGATIVE
HCO3 SERPL-SCNC: 20 MMOL/L (ref 22–29)
HCT VFR BLD AUTO: 41.9 % (ref 35–47)
HGB BLD-MCNC: 13.2 G/DL (ref 11.7–15.7)
HGB UR QL STRIP: NEGATIVE
IMM GRANULOCYTES # BLD: 0.1 10E3/UL
IMM GRANULOCYTES NFR BLD: 1 %
KETONES UR STRIP-MCNC: 80 MG/DL
LEUKOCYTE ESTERASE UR QL STRIP: NEGATIVE
LIPASE SERPL-CCNC: 22 U/L (ref 13–60)
LYMPHOCYTES # BLD AUTO: 1.1 10E3/UL (ref 0.8–5.3)
LYMPHOCYTES NFR BLD AUTO: 8 %
MCH RBC QN AUTO: 28.2 PG (ref 26.5–33)
MCHC RBC AUTO-ENTMCNC: 31.5 G/DL (ref 31.5–36.5)
MCV RBC AUTO: 90 FL (ref 78–100)
MONOCYTES # BLD AUTO: 0.9 10E3/UL (ref 0–1.3)
MONOCYTES NFR BLD AUTO: 7 %
MUCOUS THREADS #/AREA URNS LPF: PRESENT /LPF
NEUTROPHILS # BLD AUTO: 11.4 10E3/UL (ref 1.6–8.3)
NEUTROPHILS NFR BLD AUTO: 84 %
NITRATE UR QL: NEGATIVE
NRBC # BLD AUTO: 0 10E3/UL
NRBC BLD AUTO-RTO: 0 /100
PH UR STRIP: 7 [PH] (ref 5–7)
PLATELET # BLD AUTO: 313 10E3/UL (ref 150–450)
POTASSIUM SERPL-SCNC: 4.1 MMOL/L (ref 3.4–5.3)
PROT SERPL-MCNC: 7.1 G/DL (ref 6.4–8.3)
RBC # BLD AUTO: 4.68 10E6/UL (ref 3.8–5.2)
RBC URINE: 0 /HPF
SODIUM SERPL-SCNC: 136 MMOL/L (ref 135–145)
SP GR UR STRIP: 1.02 (ref 1–1.03)
SQUAMOUS EPITHELIAL: 1 /HPF
UROBILINOGEN UR STRIP-MCNC: NORMAL MG/DL
WBC # BLD AUTO: 13.6 10E3/UL (ref 4–11)
WBC URINE: 1 /HPF

## 2025-07-23 PROCEDURE — 250N000011 HC RX IP 250 OP 636: Performed by: STUDENT IN AN ORGANIZED HEALTH CARE EDUCATION/TRAINING PROGRAM

## 2025-07-23 PROCEDURE — 250N000011 HC RX IP 250 OP 636

## 2025-07-23 PROCEDURE — 80053 COMPREHEN METABOLIC PANEL: CPT | Performed by: STUDENT IN AN ORGANIZED HEALTH CARE EDUCATION/TRAINING PROGRAM

## 2025-07-23 PROCEDURE — 250N000013 HC RX MED GY IP 250 OP 250 PS 637: Performed by: ANESTHESIOLOGY

## 2025-07-23 PROCEDURE — 250N000025 HC SEVOFLURANE, PER MIN: Performed by: SURGERY

## 2025-07-23 PROCEDURE — 88304 TISSUE EXAM BY PATHOLOGIST: CPT | Mod: 26 | Performed by: PATHOLOGY

## 2025-07-23 PROCEDURE — 96375 TX/PRO/DX INJ NEW DRUG ADDON: CPT | Mod: 59

## 2025-07-23 PROCEDURE — 258N000003 HC RX IP 258 OP 636: Performed by: STUDENT IN AN ORGANIZED HEALTH CARE EDUCATION/TRAINING PROGRAM

## 2025-07-23 PROCEDURE — 96374 THER/PROPH/DIAG INJ IV PUSH: CPT | Mod: 59

## 2025-07-23 PROCEDURE — 99285 EMERGENCY DEPT VISIT HI MDM: CPT | Mod: 25 | Performed by: STUDENT IN AN ORGANIZED HEALTH CARE EDUCATION/TRAINING PROGRAM

## 2025-07-23 PROCEDURE — 250N000009 HC RX 250: Performed by: NURSE ANESTHETIST, CERTIFIED REGISTERED

## 2025-07-23 PROCEDURE — 88304 TISSUE EXAM BY PATHOLOGIST: CPT | Mod: TC | Performed by: SURGERY

## 2025-07-23 PROCEDURE — 999N000141 HC STATISTIC PRE-PROCEDURE NURSING ASSESSMENT: Performed by: SURGERY

## 2025-07-23 PROCEDURE — 710N000012 HC RECOVERY PHASE 2, PER MINUTE: Performed by: SURGERY

## 2025-07-23 PROCEDURE — 250N000009 HC RX 250

## 2025-07-23 PROCEDURE — 99207 PR INPT ADMISSION FROM CLINIC: CPT | Performed by: INTERNAL MEDICINE

## 2025-07-23 PROCEDURE — 99204 OFFICE O/P NEW MOD 45 MIN: CPT | Mod: FS | Performed by: SURGERY

## 2025-07-23 PROCEDURE — 250N000011 HC RX IP 250 OP 636: Performed by: NURSE ANESTHETIST, CERTIFIED REGISTERED

## 2025-07-23 PROCEDURE — 710N000010 HC RECOVERY PHASE 1, LEVEL 2, PER MIN: Performed by: SURGERY

## 2025-07-23 PROCEDURE — 36415 COLL VENOUS BLD VENIPUNCTURE: CPT | Performed by: STUDENT IN AN ORGANIZED HEALTH CARE EDUCATION/TRAINING PROGRAM

## 2025-07-23 PROCEDURE — 258N000003 HC RX IP 258 OP 636: Performed by: ANESTHESIOLOGY

## 2025-07-23 PROCEDURE — 99207 PR APP CREDIT; MD BILLING SHARED VISIT: CPT

## 2025-07-23 PROCEDURE — 250N000011 HC RX IP 250 OP 636: Performed by: ANESTHESIOLOGY

## 2025-07-23 PROCEDURE — 360N000076 HC SURGERY LEVEL 3, PER MIN: Performed by: SURGERY

## 2025-07-23 PROCEDURE — 85041 AUTOMATED RBC COUNT: CPT | Performed by: STUDENT IN AN ORGANIZED HEALTH CARE EDUCATION/TRAINING PROGRAM

## 2025-07-23 PROCEDURE — 250N000011 HC RX IP 250 OP 636: Performed by: SURGERY

## 2025-07-23 PROCEDURE — 81001 URINALYSIS AUTO W/SCOPE: CPT | Performed by: STUDENT IN AN ORGANIZED HEALTH CARE EDUCATION/TRAINING PROGRAM

## 2025-07-23 PROCEDURE — 44970 LAPAROSCOPY APPENDECTOMY: CPT | Performed by: SURGERY

## 2025-07-23 PROCEDURE — 83690 ASSAY OF LIPASE: CPT | Performed by: STUDENT IN AN ORGANIZED HEALTH CARE EDUCATION/TRAINING PROGRAM

## 2025-07-23 PROCEDURE — 250N000009 HC RX 250: Performed by: SURGERY

## 2025-07-23 PROCEDURE — 370N000017 HC ANESTHESIA TECHNICAL FEE, PER MIN: Performed by: SURGERY

## 2025-07-23 PROCEDURE — 272N000001 HC OR GENERAL SUPPLY STERILE: Performed by: SURGERY

## 2025-07-23 PROCEDURE — 81025 URINE PREGNANCY TEST: CPT | Performed by: STUDENT IN AN ORGANIZED HEALTH CARE EDUCATION/TRAINING PROGRAM

## 2025-07-23 PROCEDURE — 74177 CT ABD & PELVIS W/CONTRAST: CPT

## 2025-07-23 RX ORDER — ONDANSETRON 2 MG/ML
4 INJECTION INTRAMUSCULAR; INTRAVENOUS EVERY 30 MIN PRN
Status: DISCONTINUED | OUTPATIENT
Start: 2025-07-23 | End: 2025-07-23 | Stop reason: HOSPADM

## 2025-07-23 RX ORDER — SODIUM CHLORIDE, SODIUM LACTATE, POTASSIUM CHLORIDE, CALCIUM CHLORIDE 600; 310; 30; 20 MG/100ML; MG/100ML; MG/100ML; MG/100ML
INJECTION, SOLUTION INTRAVENOUS CONTINUOUS
Status: DISCONTINUED | OUTPATIENT
Start: 2025-07-23 | End: 2025-07-23 | Stop reason: HOSPADM

## 2025-07-23 RX ORDER — ONDANSETRON 2 MG/ML
4 INJECTION INTRAMUSCULAR; INTRAVENOUS ONCE
Status: COMPLETED | OUTPATIENT
Start: 2025-07-23 | End: 2025-07-23

## 2025-07-23 RX ORDER — LIDOCAINE 40 MG/G
CREAM TOPICAL
Status: DISCONTINUED | OUTPATIENT
Start: 2025-07-23 | End: 2025-07-23 | Stop reason: HOSPADM

## 2025-07-23 RX ORDER — NALOXONE HYDROCHLORIDE 0.4 MG/ML
0.1 INJECTION, SOLUTION INTRAMUSCULAR; INTRAVENOUS; SUBCUTANEOUS
Status: DISCONTINUED | OUTPATIENT
Start: 2025-07-23 | End: 2025-07-23 | Stop reason: HOSPADM

## 2025-07-23 RX ORDER — METRONIDAZOLE 500 MG/100ML
INJECTION, SOLUTION INTRAVENOUS
Status: DISCONTINUED
Start: 2025-07-23 | End: 2025-07-23 | Stop reason: HOSPADM

## 2025-07-23 RX ORDER — AZELAIC ACID 0.15 G/G
GEL TOPICAL DAILY
COMMUNITY

## 2025-07-23 RX ORDER — LIDOCAINE HYDROCHLORIDE 10 MG/ML
INJECTION, SOLUTION INFILTRATION; PERINEURAL PRN
Status: DISCONTINUED | OUTPATIENT
Start: 2025-07-23 | End: 2025-07-23

## 2025-07-23 RX ORDER — HYDROMORPHONE HCL IN WATER/PF 6 MG/30 ML
0.4 PATIENT CONTROLLED ANALGESIA SYRINGE INTRAVENOUS EVERY 5 MIN PRN
Status: DISCONTINUED | OUTPATIENT
Start: 2025-07-23 | End: 2025-07-23 | Stop reason: HOSPADM

## 2025-07-23 RX ORDER — DEXAMETHASONE SODIUM PHOSPHATE 4 MG/ML
INJECTION, SOLUTION INTRA-ARTICULAR; INTRALESIONAL; INTRAMUSCULAR; INTRAVENOUS; SOFT TISSUE PRN
Status: DISCONTINUED | OUTPATIENT
Start: 2025-07-23 | End: 2025-07-23

## 2025-07-23 RX ORDER — FENTANYL CITRATE 50 UG/ML
INJECTION, SOLUTION INTRAMUSCULAR; INTRAVENOUS PRN
Status: DISCONTINUED | OUTPATIENT
Start: 2025-07-23 | End: 2025-07-23

## 2025-07-23 RX ORDER — KETOROLAC TROMETHAMINE 30 MG/ML
INJECTION, SOLUTION INTRAMUSCULAR; INTRAVENOUS PRN
Status: DISCONTINUED | OUTPATIENT
Start: 2025-07-23 | End: 2025-07-23

## 2025-07-23 RX ORDER — OXYCODONE HYDROCHLORIDE 5 MG/1
10 TABLET ORAL
Status: DISCONTINUED | OUTPATIENT
Start: 2025-07-23 | End: 2025-07-23 | Stop reason: HOSPADM

## 2025-07-23 RX ORDER — PROPOFOL 10 MG/ML
INJECTION, EMULSION INTRAVENOUS PRN
Status: DISCONTINUED | OUTPATIENT
Start: 2025-07-23 | End: 2025-07-23

## 2025-07-23 RX ORDER — OXYCODONE HYDROCHLORIDE 5 MG/1
5 TABLET ORAL
Status: COMPLETED | OUTPATIENT
Start: 2025-07-23 | End: 2025-07-23

## 2025-07-23 RX ORDER — CEFAZOLIN SODIUM/WATER 2 G/20 ML
SYRINGE (ML) INTRAVENOUS
Status: DISCONTINUED
Start: 2025-07-23 | End: 2025-07-23 | Stop reason: HOSPADM

## 2025-07-23 RX ORDER — IOPAMIDOL 755 MG/ML
90 INJECTION, SOLUTION INTRAVASCULAR ONCE
Status: COMPLETED | OUTPATIENT
Start: 2025-07-23 | End: 2025-07-23

## 2025-07-23 RX ORDER — HYDROMORPHONE HYDROCHLORIDE 1 MG/ML
0.5 INJECTION, SOLUTION INTRAMUSCULAR; INTRAVENOUS; SUBCUTANEOUS ONCE
Status: COMPLETED | OUTPATIENT
Start: 2025-07-23 | End: 2025-07-23

## 2025-07-23 RX ORDER — CEFAZOLIN SODIUM/WATER 2 G/20 ML
2 SYRINGE (ML) INTRAVENOUS
Status: COMPLETED | OUTPATIENT
Start: 2025-07-23 | End: 2025-07-23

## 2025-07-23 RX ORDER — ONDANSETRON 4 MG/1
4 TABLET, ORALLY DISINTEGRATING ORAL EVERY 30 MIN PRN
Status: DISCONTINUED | OUTPATIENT
Start: 2025-07-23 | End: 2025-07-23 | Stop reason: HOSPADM

## 2025-07-23 RX ORDER — ONDANSETRON 2 MG/ML
INJECTION INTRAMUSCULAR; INTRAVENOUS PRN
Status: DISCONTINUED | OUTPATIENT
Start: 2025-07-23 | End: 2025-07-23

## 2025-07-23 RX ORDER — CEFAZOLIN SODIUM/WATER 2 G/20 ML
2 SYRINGE (ML) INTRAVENOUS SEE ADMIN INSTRUCTIONS
Status: DISCONTINUED | OUTPATIENT
Start: 2025-07-23 | End: 2025-07-23 | Stop reason: HOSPADM

## 2025-07-23 RX ORDER — PIPERACILLIN SODIUM, TAZOBACTAM SODIUM 3; .375 G/15ML; G/15ML
3.38 INJECTION, POWDER, LYOPHILIZED, FOR SOLUTION INTRAVENOUS ONCE
Status: COMPLETED | OUTPATIENT
Start: 2025-07-23 | End: 2025-07-23

## 2025-07-23 RX ORDER — HYDROMORPHONE HCL IN WATER/PF 6 MG/30 ML
0.2 PATIENT CONTROLLED ANALGESIA SYRINGE INTRAVENOUS EVERY 5 MIN PRN
Status: DISCONTINUED | OUTPATIENT
Start: 2025-07-23 | End: 2025-07-23 | Stop reason: HOSPADM

## 2025-07-23 RX ORDER — FENTANYL CITRATE 50 UG/ML
50 INJECTION, SOLUTION INTRAMUSCULAR; INTRAVENOUS EVERY 5 MIN PRN
Status: DISCONTINUED | OUTPATIENT
Start: 2025-07-23 | End: 2025-07-23 | Stop reason: HOSPADM

## 2025-07-23 RX ORDER — DEXAMETHASONE SODIUM PHOSPHATE 10 MG/ML
INJECTION, SOLUTION INTRAMUSCULAR; INTRAVENOUS PRN
Status: DISCONTINUED | OUTPATIENT
Start: 2025-07-23 | End: 2025-07-23

## 2025-07-23 RX ORDER — PROPOFOL 10 MG/ML
INJECTION, EMULSION INTRAVENOUS CONTINUOUS PRN
Status: DISCONTINUED | OUTPATIENT
Start: 2025-07-23 | End: 2025-07-23

## 2025-07-23 RX ORDER — DEXAMETHASONE SODIUM PHOSPHATE 4 MG/ML
4 INJECTION, SOLUTION INTRA-ARTICULAR; INTRALESIONAL; INTRAMUSCULAR; INTRAVENOUS; SOFT TISSUE
Status: DISCONTINUED | OUTPATIENT
Start: 2025-07-23 | End: 2025-07-23 | Stop reason: HOSPADM

## 2025-07-23 RX ORDER — FENTANYL CITRATE 50 UG/ML
25 INJECTION, SOLUTION INTRAMUSCULAR; INTRAVENOUS EVERY 5 MIN PRN
Status: DISCONTINUED | OUTPATIENT
Start: 2025-07-23 | End: 2025-07-23 | Stop reason: HOSPADM

## 2025-07-23 RX ORDER — BUPIVACAINE HYDROCHLORIDE AND EPINEPHRINE 2.5; 5 MG/ML; UG/ML
INJECTION, SOLUTION EPIDURAL; INFILTRATION; INTRACAUDAL; PERINEURAL PRN
Status: DISCONTINUED | OUTPATIENT
Start: 2025-07-23 | End: 2025-07-23 | Stop reason: HOSPADM

## 2025-07-23 RX ORDER — ONDANSETRON 8 MG/1
8 TABLET, ORALLY DISINTEGRATING ORAL ONCE
Status: COMPLETED | OUTPATIENT
Start: 2025-07-23 | End: 2025-07-23

## 2025-07-23 RX ORDER — TRETINOIN 0.5 MG/G
CREAM TOPICAL AT BEDTIME
COMMUNITY

## 2025-07-23 RX ORDER — ONDANSETRON 8 MG/1
8 TABLET, ORALLY DISINTEGRATING ORAL EVERY 8 HOURS PRN
Qty: 30 TABLET | Refills: 0 | Status: CANCELLED | OUTPATIENT
Start: 2025-07-23

## 2025-07-23 RX ORDER — BUPIVACAINE HYDROCHLORIDE AND EPINEPHRINE 2.5; 5 MG/ML; UG/ML
INJECTION, SOLUTION EPIDURAL; INFILTRATION; INTRACAUDAL; PERINEURAL
Status: DISCONTINUED
Start: 2025-07-23 | End: 2025-07-23 | Stop reason: HOSPADM

## 2025-07-23 RX ORDER — OXYCODONE HYDROCHLORIDE 5 MG/1
2.5-5 TABLET ORAL EVERY 4 HOURS PRN
Qty: 6 TABLET | Refills: 0 | Status: SHIPPED | OUTPATIENT
Start: 2025-07-23

## 2025-07-23 RX ORDER — METRONIDAZOLE 500 MG/100ML
500 INJECTION, SOLUTION INTRAVENOUS
Status: COMPLETED | OUTPATIENT
Start: 2025-07-23 | End: 2025-07-23

## 2025-07-23 RX ADMIN — FENTANYL CITRATE 50 MCG: 50 INJECTION INTRAMUSCULAR; INTRAVENOUS at 16:05

## 2025-07-23 RX ADMIN — FENTANYL CITRATE 25 MCG: 50 INJECTION INTRAMUSCULAR; INTRAVENOUS at 17:31

## 2025-07-23 RX ADMIN — ROCURONIUM BROMIDE 50 MG: 10 INJECTION, SOLUTION INTRAVENOUS at 16:08

## 2025-07-23 RX ADMIN — FENTANYL CITRATE 50 MCG: 50 INJECTION INTRAMUSCULAR; INTRAVENOUS at 15:55

## 2025-07-23 RX ADMIN — ONDANSETRON 4 MG: 2 INJECTION INTRAMUSCULAR; INTRAVENOUS at 16:55

## 2025-07-23 RX ADMIN — ONDANSETRON 8 MG: 8 TABLET, ORALLY DISINTEGRATING ORAL at 11:05

## 2025-07-23 RX ADMIN — FENTANYL CITRATE 25 MCG: 50 INJECTION INTRAMUSCULAR; INTRAVENOUS at 17:22

## 2025-07-23 RX ADMIN — PROPOFOL 200 MG: 10 INJECTION, EMULSION INTRAVENOUS at 16:08

## 2025-07-23 RX ADMIN — FENTANYL CITRATE 25 MCG: 50 INJECTION INTRAMUSCULAR; INTRAVENOUS at 17:38

## 2025-07-23 RX ADMIN — KETOROLAC TROMETHAMINE 15 MG: 30 INJECTION, SOLUTION INTRAMUSCULAR at 16:52

## 2025-07-23 RX ADMIN — DEXAMETHASONE SODIUM PHOSPHATE 10 MG: 10 INJECTION, SOLUTION INTRAMUSCULAR; INTRAVENOUS at 16:08

## 2025-07-23 RX ADMIN — PROPOFOL 200 MCG/KG/MIN: 10 INJECTION, EMULSION INTRAVENOUS at 16:08

## 2025-07-23 RX ADMIN — METRONIDAZOLE 500 MG: 5 INJECTION, SOLUTION INTRAVENOUS at 16:17

## 2025-07-23 RX ADMIN — SODIUM CHLORIDE 1000 ML: 0.9 INJECTION, SOLUTION INTRAVENOUS at 13:51

## 2025-07-23 RX ADMIN — FENTANYL CITRATE 25 MCG: 50 INJECTION INTRAMUSCULAR; INTRAVENOUS at 17:43

## 2025-07-23 RX ADMIN — HYDROMORPHONE HYDROCHLORIDE 0.5 MG: 1 INJECTION, SOLUTION INTRAMUSCULAR; INTRAVENOUS; SUBCUTANEOUS at 16:49

## 2025-07-23 RX ADMIN — LIDOCAINE HYDROCHLORIDE 5 ML: 10 INJECTION, SOLUTION INFILTRATION; PERINEURAL at 16:08

## 2025-07-23 RX ADMIN — Medication 200 MG: at 16:56

## 2025-07-23 RX ADMIN — OXYCODONE HYDROCHLORIDE 5 MG: 5 TABLET ORAL at 17:53

## 2025-07-23 RX ADMIN — IOPAMIDOL 90 ML: 755 INJECTION, SOLUTION INTRAVENOUS at 13:36

## 2025-07-23 RX ADMIN — ONDANSETRON 4 MG: 2 INJECTION, SOLUTION INTRAMUSCULAR; INTRAVENOUS at 13:41

## 2025-07-23 RX ADMIN — FENTANYL CITRATE 50 MCG: 50 INJECTION INTRAMUSCULAR; INTRAVENOUS at 16:32

## 2025-07-23 RX ADMIN — PIPERACILLIN AND TAZOBACTAM 3.38 G: 3; .375 INJECTION, POWDER, FOR SOLUTION INTRAVENOUS at 14:33

## 2025-07-23 RX ADMIN — HYDROMORPHONE HYDROCHLORIDE 0.5 MG: 1 INJECTION, SOLUTION INTRAMUSCULAR; INTRAVENOUS; SUBCUTANEOUS at 13:41

## 2025-07-23 RX ADMIN — HYDROMORPHONE HYDROCHLORIDE 0.5 MG: 1 INJECTION, SOLUTION INTRAMUSCULAR; INTRAVENOUS; SUBCUTANEOUS at 17:10

## 2025-07-23 RX ADMIN — Medication 2 G: at 15:55

## 2025-07-23 RX ADMIN — SODIUM CHLORIDE, SODIUM LACTATE, POTASSIUM CHLORIDE, AND CALCIUM CHLORIDE: .6; .31; .03; .02 INJECTION, SOLUTION INTRAVENOUS at 15:57

## 2025-07-23 RX ADMIN — DEXAMETHASONE SODIUM PHOSPHATE 4 MG: 4 INJECTION, SOLUTION INTRA-ARTICULAR; INTRALESIONAL; INTRAMUSCULAR; INTRAVENOUS; SOFT TISSUE at 16:08

## 2025-07-23 RX ADMIN — PROPOFOL 30 MG: 10 INJECTION, EMULSION INTRAVENOUS at 16:34

## 2025-07-23 ASSESSMENT — ENCOUNTER SYMPTOMS
HEADACHES: 0
HEMATOCHEZIA: 0
SORE THROAT: 0
DYSURIA: 0
MYALGIAS: 0
FEVER: 0
CHILLS: 0
APPETITE CHANGE: 0
DIAPHORESIS: 0
COUGH: 0

## 2025-07-23 ASSESSMENT — ACTIVITIES OF DAILY LIVING (ADL)
ADLS_ACUITY_SCORE: 41
ADLS_ACUITY_SCORE: 41
ADLS_ACUITY_SCORE: 43
ADLS_ACUITY_SCORE: 44
ADLS_ACUITY_SCORE: 41
ADLS_ACUITY_SCORE: 41
ADLS_ACUITY_SCORE: 44
ADLS_ACUITY_SCORE: 41

## 2025-07-23 ASSESSMENT — COLUMBIA-SUICIDE SEVERITY RATING SCALE - C-SSRS
2. HAVE YOU ACTUALLY HAD ANY THOUGHTS OF KILLING YOURSELF IN THE PAST MONTH?: NO
1. IN THE PAST MONTH, HAVE YOU WISHED YOU WERE DEAD OR WISHED YOU COULD GO TO SLEEP AND NOT WAKE UP?: NO
6. HAVE YOU EVER DONE ANYTHING, STARTED TO DO ANYTHING, OR PREPARED TO DO ANYTHING TO END YOUR LIFE?: NO

## 2025-07-23 NOTE — ED PROVIDER NOTES
EMERGENCY DEPARTMENT ENCOUNTER      NAME: Sudha Arevalo  AGE: 29 year old female  YOB: 1995  MRN: [id number]  EVALUATION DATE & TIME: No admission date for patient encounter.    PCP: Tika Butt    ED PROVIDER: Omi Schneider MD      Chief Complaint   Patient presents with    Abdominal Pain         FINAL IMPRESSION:  1. Acute appendicitis with localized peritonitis, without perforation, abscess, or gangrene          ED COURSE & MEDICAL DECISION MAKING:    Pertinent Labs & Imaging studies reviewed. (See chart for details)  29 year old female presents to the Emergency Department for evaluation of abdominal pain    ED Course as of 07/23/25 1433   Wed Jul 23, 2025   1252 Patient is a 49-year-old female presenting to the emergency department for evaluation abdominal pain around 4 AM this morning patient began having some generalized abdominal pain and now seems to be localizing more to the right lower quadrant.  Is had nausea with 1 episode of nonbloody nonbilious emesis.  No recent fevers.  Denies any abnormal vaginal bleeding or discharge.  No diarrhea.  No blood in the stools.  No urinary symptoms.  Was seen earlier at urgent care and given  significant tenderness in the right lower quadrant and sent to the ED for further evaluation concern of possible appendicitis.  Denies any prior abdominal surgeries.    On exam patient is well-appearing in no acute distress.  Hemodynamically stable and afebrile.  Does have reproducible tenderness in both the right lower quadrant and epigastrium with some voluntary guarding, no CVA tenderness.  Heart is regular in rate and rhythm and lungs are clear without any wheezes rales.    Initial differential clues was not limited to appendicitis, cholecystitis, pancreatitis.  Will obtain blood work as well as CT abdomen pelvis to evaluate acute intra-abdominal process.   1351 Labs reviewed inter myself.  Slight leukocytosis of 13.6.  Normal hemoglobin.  No significant  metabolic derangement.  Lipase within normal limits.    My personal review of CT I do think I see some appendiceal dilatation with questionably a fecalith concerning for appendicitis.   1411 Radiology interpretations are so uncomplicated acute appendicitis without any signs of perforation.  There is noted to be an appendicolith.    Patient updated.    Call to surgery.   1431 Discussed with Dr. Vieyra. Surgery will evaluate emergency department.    Final disposition pending at the my shift but I imagine the patient will go to the OR later today pending OR availability.     12:34 PM I met and evaluated the patient.         Medical Decision Making    Admit.    MIPS (CTPE, Dental pain, Ge, Sinusitis, Asthma/COPD, Head Trauma): Not Applicable    SEPSIS: None           At the conclusion of the encounter I discussed the results of all of the tests and the disposition. The questions were answered. The patient or family acknowledged understanding and was agreeable with the care plan.     0 minutes of critical care time     MEDICATIONS GIVEN IN THE EMERGENCY:  Medications   sodium chloride 0.9% BOLUS 1,000 mL (1,000 mLs Intravenous $New Bag 7/23/25 1351)   piperacillin-tazobactam (ZOSYN) 3.375 g vial to attach to  mL bag (has no administration in time range)   HYDROmorphone (PF) (DILAUDID) injection 0.5 mg (0.5 mg Intravenous $Given 7/23/25 1341)   iopamidol (ISOVUE-370) solution 90 mL (90 mLs Intravenous $Given 7/23/25 1336)   ondansetron (ZOFRAN) injection 4 mg (4 mg Intravenous $Given 7/23/25 1341)       NEW PRESCRIPTIONS STARTED AT TODAY'S ER VISIT  New Prescriptions    No medications on file          =================================================================    HPI    Patient information was obtained from: patient.    Use of : N/A         Sudha Arevalo is a 29 year old female with a pertinent history of NA who presents to this ED by walk-in for evaluation of abdominal pain.    The  patient reports of having constant RLQ abdominal pain that started at 4:00 AM today. She states this is the first time she has ever had this pain. Notes that she took peptobismol and passed gas with no relief and reports one episode of vomiting after peptobismol intake. She denies diarrhea, hematuria, dysuria, vaginal bleeding, fever, abdominal surgeries, or pertinent medical problems. There were no other concerns/complaints at this time.     Per chart review,  7/23/25 (Today):  Patient was seen at [facility] for RLQ abdominal pain. Patient abdomen palpated and grabbed providers hand to pull it away; Significant guarding with right lower quadrant tenderness.  Next greatest area was periumbilical. Discussed concern for appendicitis. Patient was refereed to the ER.       REVIEW OF SYSTEMS   Refer to the [facility]    PAST MEDICAL HISTORY:  Past Medical History:   Diagnosis Date    Amblyopia     Wore eye patch as a child    Strabismus        PAST SURGICAL HISTORY:  History reviewed. No pertinent surgical history.        CURRENT MEDICATIONS:    metroNIDAZOLE (METROCREAM) 0.75 % external cream  spironolactone (ALDACTONE) 50 MG tablet        ALLERGIES:  No Known Allergies    FAMILY HISTORY:  Family History   Problem Relation Age of Onset    Breast Cancer Maternal Grandmother     Crohn's Disease Maternal Grandmother     Hyperkalemia Maternal Grandmother     Coronary Artery Disease Maternal Grandfather     Hypotension Paternal Grandmother     Amblyopia No family hx of     Glaucoma No family hx of     Macular Degeneration No family hx of        SOCIAL HISTORY:   Social History     Socioeconomic History    Marital status: Single   Tobacco Use    Smoking status: Never    Smokeless tobacco: Never   Vaping Use    Vaping status: Never Used   Substance and Sexual Activity    Alcohol use: Yes     Comment: occassional one drink every 3 weeks    Drug use: Yes     Types: Marijuana    Sexual activity: Never     Social Drivers  "of Health     Interpersonal Safety: Low Risk  (4/29/2024)    Interpersonal Safety     Do you feel physically and emotionally safe where you currently live?: Yes     Within the past 12 months, have you been hit, slapped, kicked or otherwise physically hurt by someone?: No     Within the past 12 months, have you been humiliated or emotionally abused in other ways by your partner or ex-partner?: No       VITALS:  /68   Pulse 82   Temp 98.4  F (36.9  C)   Resp 20   Ht 1.778 m (5' 10\")   LMP 07/03/2025   SpO2 100%   BMI 36.65 kg/m      PHYSICAL EXAM    Constitutional: Well developed, Well nourished, NAD,   HENT: Normocephalic, Atraumatic,  mucous membranes moist,   Neck- trachea midline, No stridor.    Eyes:EOMI, Conjunctiva normal, No discharge.   Respiratory: Normal breath sounds, No respiratory distress, No wheezing.    Cardiovascular: Normal heart rate, Regular rhythm,  No murmurs,   Abdominal: Soft, Epigastric and RLQ abdominal tenderness, No rebound. Mild guarding.     Musculoskeletal: no deformity or malalignment   Integument: Warm, Dry, No erythema,    Neurologic: Alert & oriented x 3   Psychiatric: Affect normal, Cooperative.      LAB:  All pertinent labs reviewed and interpreted.  Results for orders placed or performed during the hospital encounter of 07/23/25   CT Abdomen Pelvis w Contrast    Impression    IMPRESSION:   1.  Uncomplicated acute appendicitis with no abscess or CT sign of perforation secondary to an obstructing appendicolith at the junction of the proximal and middle thirds of the appendix. Recommend surgical consultation.  2.  Mild diffuse hepatic steatosis.     HCG qualitative urine   Result Value Ref Range    hCG Urine Qualitative Negative Negative   Comprehensive Metabolic Panel (Limited Occurrences)   Result Value Ref Range    Sodium 136 135 - 145 mmol/L    Potassium 4.1 3.4 - 5.3 mmol/L    Carbon Dioxide (CO2) 20 (L) 22 - 29 mmol/L    Anion Gap 13 7 - 15 mmol/L    Urea Nitrogen " 9.1 6.0 - 20.0 mg/dL    Creatinine 0.81 0.51 - 0.95 mg/dL    GFR Estimate >90 >60 mL/min/1.73m2    Calcium 9.3 8.8 - 10.4 mg/dL    Chloride 103 98 - 107 mmol/L    Glucose 111 (H) 70 - 99 mg/dL    Alkaline Phosphatase 90 40 - 150 U/L    AST 33 0 - 45 U/L    ALT 22 0 - 50 U/L    Protein Total 7.1 6.4 - 8.3 g/dL    Albumin 4.4 3.5 - 5.2 g/dL    Bilirubin Total 0.3 <=1.2 mg/dL   Result Value Ref Range    Lipase 22 13 - 60 U/L   CBC with platelets and differential   Result Value Ref Range    WBC Count 13.6 (H) 4.0 - 11.0 10e3/uL    RBC Count 4.68 3.80 - 5.20 10e6/uL    Hemoglobin 13.2 11.7 - 15.7 g/dL    Hematocrit 41.9 35.0 - 47.0 %    MCV 90 78 - 100 fL    MCH 28.2 26.5 - 33.0 pg    MCHC 31.5 31.5 - 36.5 g/dL    RDW 13.2 10.0 - 15.0 %    Platelet Count 313 150 - 450 10e3/uL    % Neutrophils 84 %    % Lymphocytes 8 %    % Monocytes 7 %    % Eosinophils 0 %    % Basophils 0 %    % Immature Granulocytes 1 %    NRBCs per 100 WBC 0 <1 /100    Absolute Neutrophils 11.4 (H) 1.6 - 8.3 10e3/uL    Absolute Lymphocytes 1.1 0.8 - 5.3 10e3/uL    Absolute Monocytes 0.9 0.0 - 1.3 10e3/uL    Absolute Eosinophils 0.0 0.0 - 0.7 10e3/uL    Absolute Basophils 0.0 0.0 - 0.2 10e3/uL    Absolute Immature Granulocytes 0.1 <=0.4 10e3/uL    Absolute NRBCs 0.0 10e3/uL       RADIOLOGY:  Reviewed all pertinent imaging. Please see official radiology report.  CT Abdomen Pelvis w Contrast   Final Result   IMPRESSION:    1.  Uncomplicated acute appendicitis with no abscess or CT sign of perforation secondary to an obstructing appendicolith at the junction of the proximal and middle thirds of the appendix. Recommend surgical consultation.   2.  Mild diffuse hepatic steatosis.             EKG:        PROCEDURES:         [facility] Documentation:   CMS Diagnoses:              I, Advait Thakore, am serving as a scribe to document services personally performed by Omi Schneider MD based on my observation and the provider's statements to me. I,  Omi Schneider MD, attest that Kellen Tran is acting in a scribe capacity, has observed my performance of the services and has documented them in accordance with my direction.    Omi Schneider MD  [facility] EMERGENCY ROOM  [address] [address]  [phone]      Omi Schneider MD  07/23/25 9298

## 2025-07-23 NOTE — ED TRIAGE NOTES
Pt here with right lower quadrant and umbilical pain that started at 0400 this am. Pt went to clinic and had brief exam but had a lot of pain when examiner touched abdomen. Here with her mother.

## 2025-07-23 NOTE — DISCHARGE INSTRUCTIONS
You received IV Toradol, an NSAID medication, at 4:50pm, so you cannot have Ibuprofen or other NSAIDs until 10:50pm tonight.     You received Oxycodone 5mg at 7:50pm, so you cannot have more Oxycodone until 11:50pm tonight.     You did not receive Tylenol here, so you can have some when you get home if you would like.

## 2025-07-23 NOTE — ANESTHESIA PROCEDURE NOTES
Airway         Procedure Start/Stop Times: 7/23/2025 4:12 PM  Staff -        CRNA: Susy Albright APRN CRNA       Performed By: CRNA  Consent for Airway        Urgency: elective  Indications and Patient Condition       Indications for airway management: marina-procedural       Induction type:intravenous       Mask difficulty assessment: 1 - vent by mask    Final Airway Details       Final airway type: endotracheal airway       Successful airway: ETT - single  Endotracheal Airway Details        ETT size (mm): 7.0       Cuffed: yes       Cuff volume (mL): 7       Successful intubation technique: direct laryngoscopy       DL Blade Type: [place] 2       Grade View of Cords: 1       Position: Right       Measured from: lips       Secured at (cm): 23       Bite block used: Soft    Post intubation assessment        Placement verified by: capnometry, equal breath sounds and chest rise        Number of attempts at approach: 1       Number of other approaches attempted: 0       Secured with: tape       Ease of procedure: easy       Dentition: Intact and Unchanged       Dental guard used and removed. Dental Guard Type: Standard White.    Medication(s) Administered   Medication Administration Time: 7/23/2025 4:12 PM

## 2025-07-23 NOTE — PROGRESS NOTES
ASSESSMENT AND PLAN:      ICD-10-CM    1. RLQ abdominal pain  R10.31 ondansetron (ZOFRAN ODT) ODT tab 8 mg      2. Nausea and vomiting, unspecified vomiting type  R11.2 ondansetron (ZOFRAN ODT) ODT tab 8 mg      3. Periumbilical abdominal pain  R10.33 ondansetron (ZOFRAN ODT) ODT tab 8 mg          Differential Diagnosis:  Abdominal Pain: Constipation, Appendix, Ovarian Cyst, Pancreatitis, Hepatitis, and Non Specific    Patient Instructions   Referral to ER for right lower quadrant pain  Concern for appendicitis  Discussed potential differential with her but greatest concern being appendicitis  Patient will drive directly to ER hospital        Neena Wing MD  [facility] URGENT [facility]ARE    Subjective     Sudha Arevalo is a 29 year old who presents for Patient presents with:  Urgent Care  Abdominal Pain: Stomach pain started early this morning, nausea, vomited today.    an established patient of [facility].    Gastro    Onset of symptoms was this morning 5 am with nausea  and stomach ache - sharp & crampy.    Passed gas, bowel movement - balls - constipated, no blood, then had full bowel movement   nausea and vomiting 1 hour later    Pain 7.5    Course of illness is same.    treatment pepto-bismal -not helpful  Aggravating factors: nothing.    Alleviating factors:nothing  Diarrhea: No  Stools: As above    Appetite: decreased  Risk factors:.none  denies sick contacts, possible bad food exposure, travel , recent antibiotic use, recent medication changes, and hx of IBS  Alcohol - 2 weeks ago  Caffeine - yesterday.    Review of Systems   Constitutional:  Negative for appetite change, chills, diaphoresis and fever.   HENT:  Negative for congestion and sore throat.    Respiratory:  Negative for cough.    Gastrointestinal:  Negative for hematochezia.   Genitourinary:  Negative for dysuria.   Musculoskeletal:  Negative for myalgias.   Skin:  Negative for rash.   Neurological:  Negative for headaches.  "          Objective    /75   Pulse 86   Temp 96.9  F (36.1  C) (Temporal)   Resp 16   Ht 1.778 m (5' 10\")   LMP 07/03/2025   SpO2 100%   BMI 36.65 kg/m    Physical Exam  Vitals reviewed.   Constitutional:       Appearance: Normal appearance.   HENT:      Mouth/Throat:      Mouth: Mucous membranes are moist.      Pharynx: Oropharynx is clear.   Eyes:      General: No scleral icterus.  Cardiovascular:      Rate and Rhythm: Normal rate and regular rhythm.      Pulses: Normal pulses.      Heart sounds: Normal heart sounds.   Pulmonary:      Effort: Pulmonary effort is normal.      Breath sounds: Normal breath sounds.   Abdominal:      Palpations: Abdomen is soft.      Tenderness: There is abdominal tenderness (RLQ >> periumbilical & surrounding areas). There is guarding. There is no right CVA tenderness or left CVA tenderness.   Musculoskeletal:      Comments: Lumbar nontender    Neurological:      Mental Status: She is alert.      With examination when I palpated her right lower quadrant she grabbed my hand and pulled it away.  Significant guarding with right lower quadrant tenderness.  Next greatest area was periumbilical            "

## 2025-07-23 NOTE — PHARMACY-ADMISSION MEDICATION HISTORY
Pharmacy Intern Admission Medication History    Admission medication history is complete. The information provided in this note is only as accurate as the sources available at the time of the update.    Information Source(s): Patient and CareEverywhere/SureScripts via in-person    Pertinent Information:   Patient takes care of her own medications.   Patient was recently seen at [facility] where her spironolactone dose was increased to 100 mg from 50 mg. Verified with the patient which strength she was taking and she reported 50 mg. Her most recent fill from 7/9/25 has Spironolactone 100 mg.   Patient reports she was given Zofran at Urgent Care before she got here and then was given some when she got here.     Changes made to PTA medication list:  Added:   Retin-A  Azelaic Acid  Pepto Bismol   Deleted: None  Changed: Added SIG for metronidazole     Allergies reviewed with patient and updates made in EHR: yes    Current Facility-Administered Medications for the 7/23/25 encounter (Hospital Encounter)   Medication    [COMPLETED] ondansetron (ZOFRAN ODT) ODT tab 8 mg     PTA Med List   Medication Sig Note Last Dose/Taking    azelaic acid (FINACIA) 15 % external gel Apply topically daily. Apply to face, follow with gentle moisturizer.  7/22/2025 Morning    Bismuth Subsalicylate (PEPTO BISMOL PO) Take 15 mLs by mouth daily as needed. 7/23/2025: Patient reports rarely taking, taking this morning 7/22 was a one time thing. She will occasionally use it but reported that before taking today, she hasn't taken in over a year.  7/23/2025 Morning    metroNIDAZOLE (METROCREAM) 0.75 % external cream Apply topically daily. Apply to face.  7/22/2025 Morning    spironolactone (ALDACTONE) 50 MG tablet Take 1 tablet (50 mg) by mouth daily  7/22/2025 Noon    tretinoin (RETIN-A) 0.05 % external cream Apply topically at bedtime. Apply to face, follow with gentle moisturizer.  7/22/2025 Bedtime       Medications Available during  hospital stay: NONE    Medication History Completed By: Franchesca Sargent  7/23/2025 3:05 PM

## 2025-07-23 NOTE — ANESTHESIA CARE TRANSFER NOTE
Patient: Sudha Arevalo    Procedure: Procedure(s):  APPENDECTOMY, LAPAROSCOPIC       Diagnosis: Acute appendicitis with localized peritonitis, without perforation, abscess, or gangrene [K35.30]  Diagnosis Additional Information: No value filed.    Anesthesia Type:   General     Note:    Oropharynx: oropharynx clear of all foreign objects and spontaneously breathing  Level of Consciousness: awake  Oxygen Supplementation: face mask    Independent Airway: airway patency satisfactory and stable  Dentition: dentition unchanged  Vital Signs Stable: post-procedure vital signs reviewed and stable  Report to RN Given: handoff report given  Patient transferred to: PACU    Handoff Report: Identifed the Patient, Identified the Reponsible Provider, Reviewed the pertinent medical history, Discussed the surgical course, Reviewed Intra-OP anesthesia mangement and issues during anesthesia, Set expectations for post-procedure period and Allowed opportunity for questions and acknowledgement of understanding      Vitals:  Vitals Value Taken Time   /80 07/23/25 17:07   Temp 35.9  C (96.6  F) 07/23/25 17:07   Pulse 94 07/23/25 17:10   Resp 22 07/23/25 17:10   SpO2 96 % 07/23/25 17:10   Vitals shown include unfiled device data.    Electronically Signed By: DUANE Lagunas CRNA  July 23, 2025  5:11 PM

## 2025-07-23 NOTE — PROGRESS NOTES
Urgent Care Clinic Visit    Chief Complaint   Patient presents with    Urgent Care    Abdominal Pain     Stomach pain started early this morning, nausea, vomited today.               7/23/2025    10:47 AM   Additional Questions   Roomed by Christi

## 2025-07-23 NOTE — OP NOTE
General Surgery Operative Note    Date of Surgery: 07/23/25     Surgeon: Bert Barahona MD    Assistant: None    Preoperative Diagnosis: Acute appendicitis    Postoperative Diagnosis: Acute appendicitis    Procedure: Laparoscopic appendectomy    EBL: 5 cc    Findings: Acutely inflamed appendix    Indications:  29-year-old woman presenting with right lower quad abdominal pain found to have acute appendicitis on workup.  After discussion of the risk benefits of laparoscopic appendectomy, the patient consented to the procedure.    Details of operation:  The patient was brought to the operating room placed on the table in the supine position.  General anesthesia was induced without incident.  The patient was prepped and draped in the usual sterile fashion.  A timeout for safety was performed.    I began with a periumbilical incision and a Veress needle was introduced.  Pneumoperitoneum was established without incident.  A 5 mm optical port was placed at the site.  There was no injury with entry.  12 mm ports placed in the left lower quadrant 5 mm port placed in the suprapubic region.  The appendix was identified in the right lower quadrant.  It was acutely inflamed.  I divided the mesoappendix using electrocautery and cleared off the base of the appendix.  I then fired a 45 blue load PETER stapler across the base of the appendix dividing it.  It was placed in Endo Catch bag and removed from the abdomen.  There was a small amount of bleeding at the end of the staple line that was controlled with electrocautery.  After this, there is no further bleeding.  I then closed the 12 mm port site fascia using an 0 Vicryl suture on a suture passer device.  The remaining ports were removed and insufflation was released.  The skin was closed with 4-0 Monocryl suture and skin glue was used as a dressing.  Patient tolerated the procedure well.  There were no immediately apparent complications.    Bert Barahona MD  General  Surgeon  [facility]  Surgery [address]  [address]  [place] [address]?  Office: [phone]

## 2025-07-23 NOTE — H&P
General Surgery H&P  Sudha Arevalo MRN# [id number]   Age/Sex: 29 year old female YOB: 1995     Diagnosis: 1. Acute appendicitis with localized peritonitis, without perforation, abscess, or gangrene               Assessment and Plan:   Assessment:  Sudha Arevalo is a 29 year old female who presents with abdominal pain that started earlier this morning around 4 AM. Afebrile with no tachycardia. Labs notable for leukocytosis. CT scan shows a uncomplicated acute appendicitis. Significant RLQ TTP on exam. Plan for laparoscopic appendectomy today. Discussed procedure, risk, and benefits. Patient amendable to surgery.    Plan:  - NPO  - IV abx  - OR today for lap appy, possible discharge later today from PACU if doing well          Chief Complaint:     Chief Complaint   Patient presents with    Abdominal Pain        History is obtained from the patient and chart review.    HPI:   Sudha Arevalo is a 29 year old female who presents with abdominal pain that started earlier this morning around 4 AM. States the pain is worse around her belly button but does radiate to her RLQ at times. Has not pain like this in the past. Associated nausea and emesis x 2. Reports chills but not take her temperature. No changes to bladder or bowel function, constipation, or diarrhea.  States she had a small BM earlier today. Initially presented to urgent care who recommended further evaluation in the ED for concern of appendicitis.    Per chart review, not on blood thinners and no previous abdominal surgery history.          Past Medical History:     Past Medical History:   Diagnosis Date    Amblyopia     Wore eye patch as a child    Strabismus               Past Surgical History:   History reviewed. No pertinent surgical history.          Social History:    reports that she has never smoked. She has never used smokeless tobacco. She reports current alcohol use. She reports current drug use. Drug:  "Marijuana.           Family History:     Family History   Problem Relation Age of Onset    Breast Cancer Maternal Grandmother     Crohn's Disease Maternal Grandmother     Hyperkalemia Maternal Grandmother     Coronary Artery Disease Maternal Grandfather     Hypotension Paternal Grandmother     Amblyopia No family hx of     Glaucoma No family hx of     Macular Degeneration No family hx of               Allergies:   No Known Allergies           Medications:     Prior to Admission medications   Medication Sig Start Date End Date Taking? Authorizing Provider   metroNIDAZOLE (METROCREAM) 0.75 % external cream  3/24/21  Yes Reported, Patient   spironolactone (ALDACTONE) 50 MG tablet Take 1 tablet (50 mg) by mouth daily 7/29/24  Yes Tika Butt APRN CNP              Review of Systems:   The Review of Systems is negative other than noted in the HPI          Physical Exam:   Patient Vitals for the past 24 hrs:   BP Temp Pulse Resp SpO2 Height   07/23/25 1345 127/68 -- 82 -- 100 % --   07/23/25 1224 -- 98.4  F (36.9  C) -- -- -- --   07/23/25 1223 133/73 -- 85 20 100 % 1.778 m (5' 10\")        No intake or output data in the 24 hours ending 07/23/25 1434   Constitutional:   Awake, alert, cooperative, no apparent distress, and appears stated age     Eyes:   PERRL, conjunctiva/corneas clear, EOM's intact; no scleral edema or icterus noted      ENT:   Normocephalic, without obvious abnormality, atraumatic, Lips, mucosa, and tongue normal      Lungs:   Normal respiratory effort, no accessory muscle use     Cardiovascular:   Regular rate and rhythm     Abdomen:   Soft, non-distended, lower abdominal TTP worse in RLQ. No rebound tenderness or involuntary guarding.     Musculoskeletal:   No obvious swelling, bruising or deformity     Skin:   Skin color and texture normal for patient, no rashes or lesions              Data:         All imaging studies reviewed by me.    Recent Results (from the past 24 hours)   HCG qualitative urine "   Result Value Ref Range    hCG Urine Qualitative Negative Negative   UA with Microscopic reflex to Culture    Specimen: Urine, Clean Catch   Result Value Ref Range    Color Urine Yellow Colorless, Straw, Light Yellow, Yellow    Appearance Urine Clear Clear    Glucose Urine Negative Negative mg/dL    Bilirubin Urine Negative Negative    Ketones Urine 80 (A) Negative mg/dL    Specific Gravity Urine 1.025 1.005 - 1.030    Blood Urine Negative Negative    pH Urine 7.0 5.0 - 7.0    Protein Albumin Urine 20 (A) Negative mg/dL    Urobilinogen Urine Normal Normal mg/dL    Nitrite Urine Negative Negative    Leukocyte Esterase Urine Negative Negative    Mucus Urine Present (A) None Seen /LPF    RBC Urine 0 <=2 /HPF    WBC Urine 1 <=5 /HPF    Squamous Epithelials Urine 1 <=1 /HPF    Narrative    Urine Culture not indicated  Urine Culture not indicated   CBC with Platelets and Differential (Limited Occurrences)    Narrative    The following orders were created for panel order CBC with Platelets and Differential (Limited Occurrences).  Procedure                               Abnormality         Status                     ---------                               -----------         ------                     CBC with platelets and ...[[id number]]  Abnormal            Final result                 Please view results for these tests on the individual orders.   Comprehensive Metabolic Panel (Limited Occurrences)   Result Value Ref Range    Sodium 136 135 - 145 mmol/L    Potassium 4.1 3.4 - 5.3 mmol/L    Carbon Dioxide (CO2) 20 (L) 22 - 29 mmol/L    Anion Gap 13 7 - 15 mmol/L    Urea Nitrogen 9.1 6.0 - 20.0 mg/dL    Creatinine 0.81 0.51 - 0.95 mg/dL    GFR Estimate >90 >60 mL/min/1.73m2    Calcium 9.3 8.8 - 10.4 mg/dL    Chloride 103 98 - 107 mmol/L    Glucose 111 (H) 70 - 99 mg/dL    Alkaline Phosphatase 90 40 - 150 U/L    AST 33 0 - 45 U/L    ALT 22 0 - 50 U/L    Protein Total 7.1 6.4 - 8.3 g/dL    Albumin 4.4 3.5 - 5.2 g/dL     Bilirubin Total 0.3 <=1.2 mg/dL   Lipase   Result Value Ref Range    Lipase 22 13 - 60 U/L   CBC with platelets and differential   Result Value Ref Range    WBC Count 13.6 (H) 4.0 - 11.0 10e3/uL    RBC Count 4.68 3.80 - 5.20 10e6/uL    Hemoglobin 13.2 11.7 - 15.7 g/dL    Hematocrit 41.9 35.0 - 47.0 %    MCV 90 78 - 100 fL    MCH 28.2 26.5 - 33.0 pg    MCHC 31.5 31.5 - 36.5 g/dL    RDW 13.2 10.0 - 15.0 %    Platelet Count 313 150 - 450 10e3/uL    % Neutrophils 84 %    % Lymphocytes 8 %    % Monocytes 7 %    % Eosinophils 0 %    % Basophils 0 %    % Immature Granulocytes 1 %    NRBCs per 100 WBC 0 <1 /100    Absolute Neutrophils 11.4 (H) 1.6 - 8.3 10e3/uL    Absolute Lymphocytes 1.1 0.8 - 5.3 10e3/uL    Absolute Monocytes 0.9 0.0 - 1.3 10e3/uL    Absolute Eosinophils 0.0 0.0 - 0.7 10e3/uL    Absolute Basophils 0.0 0.0 - 0.2 10e3/uL    Absolute Immature Granulocytes 0.1 <=0.4 10e3/uL    Absolute NRBCs 0.0 10e3/uL   CT Abdomen Pelvis w Contrast    Narrative    EXAM: CT ABDOMEN PELVIS W CONTRAST  LOCATION: [facility]  DATE: 7/23/2025    INDICATION: RLQ and epigastric abdominal pain  COMPARISON: None.  TECHNIQUE: CT scan of the abdomen and pelvis was performed following injection of IV contrast. Multiplanar reformats were obtained. Dose reduction techniques were used.  CONTRAST: Isovue 370 90 mL IV    FINDINGS:   LOWER CHEST: Visualized lungs are clear. No pleural effusion. Heart size normal with no pericardial effusion.     HEPATOBILIARY: Mild diffuse hepatic steatosis. Liver otherwise normal. No bile duct dilatation. No calcified gallstones.    PANCREAS: Normal.    SPLEEN: Spleen size normal.    ADRENAL GLANDS: Normal.    KIDNEYS/BLADDER: Kidneys, ureters and bladder are normal.    BOWEL: Uncomplicated acute appendicitis with no abscess or CT sign of perforation secondary to an obstructing appendicolith at the junction of the proximal and middle thirds of the appendix (series 3 axial image  151). The mid and distal appendix are   directed inferiorly to the right with tip just medial to the distal right external iliac vein (coronal image 46). Recommend surgical consultation.    LYMPH NODES: No lymphadenopathy.    VASCULATURE: Normal caliber abdominal aorta.      PELVIC ORGANS: Small peripheral enhancing dominant follicle right ovary with trace physiologic free fluid in the pelvis.    MUSCULOSKELETAL: Unremarkable.      Impression    IMPRESSION:   1.  Uncomplicated acute appendicitis with no abscess or CT sign of perforation secondary to an obstructing appendicolith at the junction of the proximal and middle thirds of the appendix. Recommend surgical consultation.  2.  Mild diffuse hepatic steatosis.             Leena Jamison PA-C  [facility] General Surgery  [address]  [place] [address]   [facility] [phone]

## 2025-07-23 NOTE — ANESTHESIA PREPROCEDURE EVALUATION
Anesthesia Pre-Procedure Evaluation    Patient: Sudha Arevalo   MRN: [id number] : 1995          Procedure : Procedure(s):  APPENDECTOMY, LAPAROSCOPIC         Past Medical History:   Diagnosis Date    Amblyopia     Wore eye patch as a child    Strabismus       History reviewed. No pertinent surgical history.   No Known Allergies   Social History     Tobacco Use    Smoking status: Never    Smokeless tobacco: Never   Substance Use Topics    Alcohol use: Yes     Comment: occassional one drink every 3 weeks      Wt Readings from Last 1 Encounters:   25 104.3 kg (230 lb)        Anesthesia Evaluation   Pt has not had prior anesthetic         ROS/MED HX  ENT/Pulmonary:  - neg pulmonary ROS     Neurologic:  - neg neurologic ROS     Cardiovascular:  - neg cardiovascular ROS     METS/Exercise Tolerance: >4 METS    Hematologic:  - neg hematologic  ROS     Musculoskeletal: Comment: On Spironolactone for acne - neg musculoskeletal ROS     GI/Hepatic:     (+)         appendicitis (Acute appendicitis),        (-) GERD   Renal/Genitourinary:  - neg Renal ROS     Endo:     (+)               Obesity (BMI 33),       Psychiatric/Substance Use:       Infectious Disease:       Malignancy:       Other:              Physical Exam  Airway  Mallampati: II  TM distance: >3 FB  Neck ROM: full  Mouth opening: >= 4 cm    Cardiovascular - normal exam  Rhythm: regular  Rate: normal rate     Dental Comments: Good      Pulmonary - normal examBreath sounds clear to auscultation        Neurological - normal exam  She appears awake, alert and oriented x3.    Other Findings       OUTSIDE LABS:  CBC:   Lab Results   Component Value Date    WBC 13.6 (H) 2025    HGB 13.2 2025    HCT 41.9 2025     2025     BMP:   Lab Results   Component Value Date     2025    POTASSIUM 4.1 2025    CHLORIDE 103 2025    CO2 20 (L) 2025    BUN 9.1 2025    CR 0.81 2025     (H)  "07/23/2025     COAGS: No results found for: \"PTT\", \"INR\", \"FIBR\"  POC:   Lab Results   Component Value Date    HCG Negative 07/23/2025     HEPATIC:   Lab Results   Component Value Date    ALBUMIN 4.4 07/23/2025    PROTTOTAL 7.1 07/23/2025    ALT 22 07/23/2025    AST 33 07/23/2025    ALKPHOS 90 07/23/2025    BILITOTAL 0.3 07/23/2025     OTHER:   Lab Results   Component Value Date    NORBERTO 9.3 07/23/2025    LIPASE 22 07/23/2025       Anesthesia Plan    ASA Status:  2, emergent      NPO Status: ELEVATED Aspiration Risk/Unknown   Anesthesia Type: General.  Airway: oral.  Induction: intravenous, rapid sequence.  Maintenance: TIVA.   Techniques and Equipment:       - Monitoring Plan: standard ASA monitoring     Consents    Anesthesia Plan(s) and associated risks, benefits, and realistic alternatives discussed. Questions answered and patient/representative(s) expressed understanding.     - Discussed:     - Discussed with:  Patient        - Pt is DNR/DNI Status: no DNR     Blood Consent:      - Discussed with: patient.     - Consented: consented to blood products     Postoperative Care    Pain management: multimodal analgesia.     Comments:    Other Comments: NPO appropriate, Acute appendicitis, full stomach precautions    RSI with rocuronium      TIVA               Yeni Helton MD    I have reviewed the pertinent notes and labs in the chart from the past 30 days and (re)examined the patient.  Any updates or changes from those notes are reflected in this note.    Clinically Significant Risk Factors Present on Admission                             # Obesity: Estimated body mass index is 33 kg/m  as calculated from the following:    Height as of this encounter: 1.778 m (5' 10\").    Weight as of this encounter: 104.3 kg (230 lb).                    "

## 2025-07-23 NOTE — ED NOTES
EMERGENCY DEPARTMENT PROGRESS NOTE         ED COURSE AND MEDICAL DECISION MAKING  Patient was signed out to me by Dr. Schneider at 1445      Sudha Arevalo is a 29 year old female who presents with abdominal pain.  Healthy female.  Found to have acute appendicitis.  Pain well controlled.  Surgery consulted and they are coming to see to make an operative plan.  I will follow up on surgery's recommendations.  She has received zosyn.     Patient went to surgery from the ER without further  intervention by myself before she went upstairs.    Medications   sodium chloride 0.9% BOLUS 1,000 mL (1,000 mLs Intravenous $New Bag 7/23/25 1351)   piperacillin-tazobactam (ZOSYN) 3.375 g vial to attach to  mL bag (3.375 g Intravenous $New Bag 7/23/25 1433)   HYDROmorphone (PF) (DILAUDID) injection 0.5 mg (0.5 mg Intravenous $Given 7/23/25 1341)   iopamidol (ISOVUE-370) solution 90 mL (90 mLs Intravenous $Given 7/23/25 1336)   ondansetron (ZOFRAN) injection 4 mg (4 mg Intravenous $Given 7/23/25 1341)     New Prescriptions    No medications on file       LAB  Pertinent labs results reviewed   Results for orders placed or performed during the hospital encounter of 07/23/25   CT Abdomen Pelvis w Contrast     Status: None    Narrative    EXAM: CT ABDOMEN PELVIS W CONTRAST  LOCATION: [facility]  DATE: 7/23/2025    INDICATION: RLQ and epigastric abdominal pain  COMPARISON: None.  TECHNIQUE: CT scan of the abdomen and pelvis was performed following injection of IV contrast. Multiplanar reformats were obtained. Dose reduction techniques were used.  CONTRAST: Isovue 370 90 mL IV    FINDINGS:   LOWER CHEST: Visualized lungs are clear. No pleural effusion. Heart size normal with no pericardial effusion.     HEPATOBILIARY: Mild diffuse hepatic steatosis. Liver otherwise normal. No bile duct dilatation. No calcified gallstones.    PANCREAS: Normal.    SPLEEN: Spleen size normal.    ADRENAL GLANDS:  Normal.    KIDNEYS/BLADDER: Kidneys, ureters and bladder are normal.    BOWEL: Uncomplicated acute appendicitis with no abscess or CT sign of perforation secondary to an obstructing appendicolith at the junction of the proximal and middle thirds of the appendix (series 3 axial image 151). The mid and distal appendix are   directed inferiorly to the right with tip just medial to the distal right external iliac vein (coronal image 46). Recommend surgical consultation.    LYMPH NODES: No lymphadenopathy.    VASCULATURE: Normal caliber abdominal aorta.      PELVIC ORGANS: Small peripheral enhancing dominant follicle right ovary with trace physiologic free fluid in the pelvis.    MUSCULOSKELETAL: Unremarkable.      Impression    IMPRESSION:   1.  Uncomplicated acute appendicitis with no abscess or CT sign of perforation secondary to an obstructing appendicolith at the junction of the proximal and middle thirds of the appendix. Recommend surgical consultation.  2.  Mild diffuse hepatic steatosis.     HCG qualitative urine     Status: Normal   Result Value Ref Range    hCG Urine Qualitative Negative Negative   Comprehensive Metabolic Panel (Limited Occurrences)     Status: Abnormal   Result Value Ref Range    Sodium 136 135 - 145 mmol/L    Potassium 4.1 3.4 - 5.3 mmol/L    Carbon Dioxide (CO2) 20 (L) 22 - 29 mmol/L    Anion Gap 13 7 - 15 mmol/L    Urea Nitrogen 9.1 6.0 - 20.0 mg/dL    Creatinine 0.81 0.51 - 0.95 mg/dL    GFR Estimate >90 >60 mL/min/1.73m2    Calcium 9.3 8.8 - 10.4 mg/dL    Chloride 103 98 - 107 mmol/L    Glucose 111 (H) 70 - 99 mg/dL    Alkaline Phosphatase 90 40 - 150 U/L    AST 33 0 - 45 U/L    ALT 22 0 - 50 U/L    Protein Total 7.1 6.4 - 8.3 g/dL    Albumin 4.4 3.5 - 5.2 g/dL    Bilirubin Total 0.3 <=1.2 mg/dL   Lipase     Status: Normal   Result Value Ref Range    Lipase 22 13 - 60 U/L   CBC with platelets and differential     Status: Abnormal   Result Value Ref Range    WBC Count 13.6 (H) 4.0 - 11.0  10e3/uL    RBC Count 4.68 3.80 - 5.20 10e6/uL    Hemoglobin 13.2 11.7 - 15.7 g/dL    Hematocrit 41.9 35.0 - 47.0 %    MCV 90 78 - 100 fL    MCH 28.2 26.5 - 33.0 pg    MCHC 31.5 31.5 - 36.5 g/dL    RDW 13.2 10.0 - 15.0 %    Platelet Count 313 150 - 450 10e3/uL    % Neutrophils 84 %    % Lymphocytes 8 %    % Monocytes 7 %    % Eosinophils 0 %    % Basophils 0 %    % Immature Granulocytes 1 %    NRBCs per 100 WBC 0 <1 /100    Absolute Neutrophils 11.4 (H) 1.6 - 8.3 10e3/uL    Absolute Lymphocytes 1.1 0.8 - 5.3 10e3/uL    Absolute Monocytes 0.9 0.0 - 1.3 10e3/uL    Absolute Eosinophils 0.0 0.0 - 0.7 10e3/uL    Absolute Basophils 0.0 0.0 - 0.2 10e3/uL    Absolute Immature Granulocytes 0.1 <=0.4 10e3/uL    Absolute NRBCs 0.0 10e3/uL   UA with Microscopic reflex to Culture     Status: Abnormal    Specimen: Urine, Clean Catch   Result Value Ref Range    Color Urine Yellow Colorless, Straw, Light Yellow, Yellow    Appearance Urine Clear Clear    Glucose Urine Negative Negative mg/dL    Bilirubin Urine Negative Negative    Ketones Urine 80 (A) Negative mg/dL    Specific Gravity Urine 1.025 1.005 - 1.030    Blood Urine Negative Negative    pH Urine 7.0 5.0 - 7.0    Protein Albumin Urine 20 (A) Negative mg/dL    Urobilinogen Urine Normal Normal mg/dL    Nitrite Urine Negative Negative    Leukocyte Esterase Urine Negative Negative    Mucus Urine Present (A) None Seen /LPF    RBC Urine 0 <=2 /HPF    WBC Urine 1 <=5 /HPF    Squamous Epithelials Urine 1 <=1 /HPF    Narrative    Urine Culture not indicated  Urine Culture not indicated   CBC with Platelets and Differential (Limited Occurrences)     Status: Abnormal    Narrative    The following orders were created for panel order CBC with Platelets and Differential (Limited Occurrences).  Procedure                               Abnormality         Status                     ---------                               -----------         ------                     CBC with platelets and  ...[[id number]]  Abnormal            Final result                 Please view results for these tests on the individual orders.         RADIOLOGY    Pertinent imaging reviewed   Please see official radiology report.  CT Abdomen Pelvis w Contrast   Final Result   IMPRESSION:    1.  Uncomplicated acute appendicitis with no abscess or CT sign of perforation secondary to an obstructing appendicolith at the junction of the proximal and middle thirds of the appendix. Recommend surgical consultation.   2.  Mild diffuse hepatic steatosis.             FINAL IMPRESSION    1. Acute appendicitis with localized peritonitis, without perforation, abscess, or gangrene              Juju Molina MD  07/23/25 1637       Juju Molina MD  07/23/25 6448

## 2025-07-24 ENCOUNTER — OFFICE VISIT (OUTPATIENT)
Dept: URGENT CARE | Facility: URGENT CARE | Age: 30
End: 2025-07-24
Payer: COMMERCIAL

## 2025-07-24 ENCOUNTER — PATIENT OUTREACH (OUTPATIENT)
Dept: FAMILY MEDICINE | Facility: CLINIC | Age: 30
End: 2025-07-24

## 2025-07-24 ENCOUNTER — TELEPHONE (OUTPATIENT)
Dept: SURGERY | Facility: CLINIC | Age: 30
End: 2025-07-24
Payer: COMMERCIAL

## 2025-07-24 VITALS
HEART RATE: 69 BPM | TEMPERATURE: 98.5 F | BODY MASS INDEX: 33 KG/M2 | OXYGEN SATURATION: 100 % | WEIGHT: 230 LBS | DIASTOLIC BLOOD PRESSURE: 77 MMHG | SYSTOLIC BLOOD PRESSURE: 120 MMHG | RESPIRATION RATE: 16 BRPM

## 2025-07-24 DIAGNOSIS — Z51.89 VISIT FOR WOUND CHECK: Primary | ICD-10-CM

## 2025-07-24 NOTE — PATIENT INSTRUCTIONS
I would leave the ambrocioe on the wound today and change     If the wound continues to bleed than call the surgeon to be seen.  Office:[phone]

## 2025-07-24 NOTE — PROGRESS NOTES
Assessment & Plan     Visit for wound check  Wound look okay to me  Packed with gauze and tape over skin  If bleeds thru than to call surgeon to be seen  I don't think that will happen             No follow-ups on file.    Richar Wei MD  [facility] is a 29 year old female who presents to clinic today for the following health issues:  Chief Complaint   Patient presents with    Abdominal Pain     States had appendectomy yesterday still has some bleeding around navel       HPI    Sppendectomy yesterday  Bleeding from belly button  Stopped this morning but returned later.  Would like wound check    Reviewed note from morning phone call        Review of Systems        Objective    /77   Pulse 69   Temp 98.5  F (36.9  C) (Oral)   Resp 16   Wt 104.3 kg (230 lb)   LMP 07/03/2025   SpO2 100%   BMI 33.00 kg/m    Physical Exam  Vitals and nursing note reviewed.   Constitutional:       Appearance: Normal appearance.   Abdominal:      Comments: Soft abd  Some dried blood around naval and also small amount of reddish blood  Nothing oozing from wound  No open wound that I could see   Neurological:      Mental Status: She is alert.

## 2025-07-24 NOTE — ANESTHESIA POSTPROCEDURE EVALUATION
Patient: Sudha Arevalo    Procedure: Procedure(s):  APPENDECTOMY, LAPAROSCOPIC       Anesthesia Type:  General    Note:  Disposition: Outpatient   Postop Pain Control: Uneventful            Sign Out: Well controlled pain   PONV: No   Neuro/Psych: Uneventful            Sign Out: Acceptable/Baseline neuro status   Airway/Respiratory: Uneventful            Sign Out: Acceptable/Baseline resp. status   CV/Hemodynamics: Uneventful            Sign Out: Acceptable CV status; No obvious hypovolemia; No obvious fluid overload   Other NRE: NONE   DID A NON-ROUTINE EVENT OCCUR? No    Event details/Postop Comments:  Patient recovering comfortably.        Last vitals:  Vitals Value Taken Time   /62 07/23/25 17:43   Temp 36.3  C (97.3  F) 07/23/25 17:43   Pulse 79 07/23/25 17:44   Resp 25 07/23/25 17:44   SpO2 93 % 07/23/25 17:44   Vitals shown include unfiled device data.    Electronically Signed By: Rex Graves DO  July 23, 2025  8:05 PM

## 2025-07-24 NOTE — TELEPHONE ENCOUNTER
Transitions of Care Outreach  Chief Complaint   Patient presents with    Hospital F/U       Most Recent Admission Date: 7/23/2025   Most Recent Admission Diagnosis: Acute appendicitis with localized peritonitis, without perforation, abscess, or gangrene - K35.30     Most Recent Discharge Date: 7/23/2025   Most Recent Discharge Diagnosis: Acute appendicitis with localized peritonitis, without perforation, abscess, or gangrene - K35.30  Acute post-operative pain - G89.18     Transitions of Care Assessment    Discharge Assessment  How are you doing now that you are home?: Patient is currently at urgent care to have some bleeding at belly button examined. Abdominal pain is improved. pain is tolerable.  How are your symptoms? (Red Flag symptoms escalate to triage hotline per guidelines): Improved, New  Do you know how to contact your clinic care team if you have future questions or changes to your health status? : Yes  Does the patient have their discharge instructions? : Yes  Does the patient have questions regarding their discharge instructions? : No  Were you started on any new medications or were there changes to any of your previous medications? : Yes  Does the patient have all of their medications?: Yes  Do you have questions regarding any of your medications? : No  Do you have all of your needed medical supplies or equipment (DME)?  (i.e. oxygen tank, CPAP, cane, etc.): Yes    Follow up Plan     Discharge Follow-Up  Discharge follow up appointment scheduled in alignment with recommended follow up timeframe or Transitions of Risk Category? (Low = within 30 days; Moderate= within 14 days; High= within 7 days): No  Patient's follow up appointment not scheduled: Patient declined scheduling support. Education on the importance of transitions of care follow up. Provided scheduling phone number.    No future appointments.    Outpatient Plan as outlined on AVS reviewed with patient.    For any urgent concerns, please  contact our 24 hour nurse triage line: [phone] ([phone])       Amanda GAMBOA RN  -

## 2025-07-24 NOTE — TELEPHONE ENCOUNTER
Patient is s/p robotic assisted laparoscopic appendectomy with MR 7/23/25. Called to check on patient after receiving a message from surgeon to check on patient. Patient called last night informing him that her incision was bleeding. Surgeon suggested applying pressure to help stop the bleeding and that she may need to come in today to reinforce the incision.   Patient stated that her incision was no longer bleeding and that the skin was not . Discussed concerns to monitor for and advised to call with any questions or concerns. Provided direct number if needed.    Anne-Marie SAHU RN  [facility]  General Surgery  [address] [place] [id number]  Office:[phone]  Employed by [employer]

## 2025-07-24 NOTE — PROGRESS NOTES
Urgent Care Clinic Visit    Chief Complaint   Patient presents with    Abdominal Pain     States had appendectomy yesterday still has some bleeding around navel

## 2025-07-25 LAB
PATH REPORT.COMMENTS IMP SPEC: NORMAL
PATH REPORT.COMMENTS IMP SPEC: NORMAL
PATH REPORT.FINAL DX SPEC: NORMAL
PATH REPORT.GROSS SPEC: NORMAL
PATH REPORT.MICROSCOPIC SPEC OTHER STN: NORMAL
PATH REPORT.RELEVANT HX SPEC: NORMAL
PHOTO IMAGE: NORMAL

## 2025-07-28 ENCOUNTER — TELEPHONE (OUTPATIENT)
Dept: SURGERY | Facility: CLINIC | Age: 30
End: 2025-07-28
Payer: COMMERCIAL

## 2025-07-28 NOTE — TELEPHONE ENCOUNTER
[facility] Post-Op Phone Call                     Surgeon: Bert Barahona    Date of Surgery: 7/23/25  Surgery: Bert Barahona  Discharge Date: 7/23/25    Date/Time Called:   Date: 7/28/2025 Time: 10:21 AM   Attempt: First    Attempted to contact patient to check on them post-operatively. Left a voicemail to call back with any questions or concerns.        Thank you for your time. Please do not hesitate to call us with any questions or concerns.    Call completed by: Anne-Marie Alex RN

## 2025-07-29 ENCOUNTER — TELEPHONE (OUTPATIENT)
Dept: SURGERY | Facility: CLINIC | Age: 30
End: 2025-07-29
Payer: COMMERCIAL

## 2025-07-29 NOTE — TELEPHONE ENCOUNTER
Pain Control:  Intensity: Mild (1 - 3)  Duration/Location/Explain: minimal pain managed with tylenol and ibuprofen, has been needing less frequently  What makes it better/worse?     Medications:  Narcotic Use - No  Drug type:   Frequency:     Incisions:  Drainage? clean and dry  Any fever type symptoms? No  Comment: had some bleeding early on that has resolved    GI:  Nausea? No  Vomiting? No  BM? Yes  Gas? Yes  Voiding Frequency? 4 or more/day   Appetite? Good    Activity:  Walking activity? Yes  Frequency/Type: as tolerated  Restrictions: Avoid heavy lifting for 4 weeks    Return to Work Plans?  Expected date has already returned to work, [employer]  Do you need anything from us in this regard? No     Post-op appointment made? No          Thank you for your time. Please do not hesitate to call us with any questions or concerns.    Call completed by: Anne-Marie Alex RN

## (undated) DEVICE — SOLUTION IRRIGATION 0.9% NACL 1000ML BOTTLE R5200-01

## (undated) DEVICE — STPL ENDO RELOAD 45X3.5MM 6R45B

## (undated) DEVICE — SUCTION MANIFOLD NEPTUNE 2 SYS 1 PORT 702-025-000

## (undated) DEVICE — VIAL DECANTER STERILE WHITE DYNJDEC06

## (undated) DEVICE — TUBING SMOKE EVAC PNEUMOCLEAR HIGH FLOW 0620050250

## (undated) DEVICE — SUTURE MONOCRYL+ 4-0 PS-2 27IN MCP426H

## (undated) DEVICE — ESU CORD MONOPOLAR 10'  E0510

## (undated) DEVICE — GLOVE BIOGEL PI ULTRATOUCH G SZ 7.5 42175

## (undated) DEVICE — GLOVE BIOGEL PI INDICATOR 8.0 LF 41680

## (undated) DEVICE — PREP CHLORAPREP 26ML TINTED HI-LITE ORANGE 930815

## (undated) DEVICE — STPL ENDO LINEAR CUT ARTICULATING 45MM ATS45

## (undated) DEVICE — SOLUTION WATER 1000ML BOTTLE R5000-01

## (undated) DEVICE — ENDO TROCAR FIRST ENTRY KII FIOS Z-THRD 05X100MM CTF03

## (undated) DEVICE — SU DERMABOND ADVANCED .7ML DNX12

## (undated) DEVICE — SU VICRYL+ 0 27 UR6 VLT VCP603H

## (undated) DEVICE — ENDO TROCAR SLEEVE KII Z-THREADED 05X100MM CTS02

## (undated) DEVICE — ELECTRODE PATIENT RETURN ADULT L10 FT 2 PLATE CORD 0855C

## (undated) DEVICE — ENDO TROCAR FIRST ENTRY KII FIOS Z-THRD 12X100MM CTF73

## (undated) DEVICE — CUSTOM PACK LAP CHOLE SBA5BLCHEA

## (undated) DEVICE — ENDO POUCH UNIV RETRIEVAL SYSTEM INZII 10MM CD001

## (undated) DEVICE — NDL INSUFFLATION 13GA 120MM C2201

## (undated) RX ORDER — FENTANYL CITRATE 50 UG/ML
INJECTION, SOLUTION INTRAMUSCULAR; INTRAVENOUS
Status: DISPENSED
Start: 2025-07-23